# Patient Record
Sex: MALE | Race: BLACK OR AFRICAN AMERICAN | Employment: OTHER | URBAN - METROPOLITAN AREA
[De-identification: names, ages, dates, MRNs, and addresses within clinical notes are randomized per-mention and may not be internally consistent; named-entity substitution may affect disease eponyms.]

---

## 2017-03-06 ENCOUNTER — OFFICE VISIT (OUTPATIENT)
Dept: CARDIOLOGY CLINIC | Age: 67
End: 2017-03-06

## 2017-03-06 VITALS
SYSTOLIC BLOOD PRESSURE: 140 MMHG | RESPIRATION RATE: 16 BRPM | WEIGHT: 287.6 LBS | DIASTOLIC BLOOD PRESSURE: 84 MMHG | HEIGHT: 72 IN | BODY MASS INDEX: 38.95 KG/M2 | HEART RATE: 86 BPM

## 2017-03-06 DIAGNOSIS — I10 BENIGN ESSENTIAL HTN: ICD-10-CM

## 2017-03-06 DIAGNOSIS — R06.02 SHORTNESS OF BREATH: ICD-10-CM

## 2017-03-06 DIAGNOSIS — R60.0 BILATERAL EDEMA OF LOWER EXTREMITY: ICD-10-CM

## 2017-03-06 DIAGNOSIS — I42.9 CARDIOMYOPATHY (HCC): ICD-10-CM

## 2017-03-06 DIAGNOSIS — R60.9 SWELLING: ICD-10-CM

## 2017-03-06 DIAGNOSIS — I20.0 UNSTABLE ANGINA (HCC): Primary | ICD-10-CM

## 2017-03-06 RX ORDER — CARVEDILOL 25 MG/1
1 TABLET ORAL 2 TIMES DAILY
Refills: 1 | COMMUNITY
Start: 2017-02-15 | End: 2018-09-24 | Stop reason: SDUPTHER

## 2017-03-06 RX ORDER — FLUTICASONE PROPIONATE 50 MCG
2 SPRAY, SUSPENSION (ML) NASAL DAILY
Refills: 2 | COMMUNITY
Start: 2017-02-01

## 2017-03-06 RX ORDER — LORATADINE 10 MG/1
10 TABLET ORAL DAILY
Refills: 2 | COMMUNITY
Start: 2017-02-01 | End: 2017-08-11

## 2017-03-06 RX ORDER — LISINOPRIL 40 MG/1
1 TABLET ORAL DAILY
Refills: 0 | COMMUNITY
Start: 2016-12-21 | End: 2017-08-11 | Stop reason: ALTCHOICE

## 2017-03-06 RX ORDER — HYDROCHLOROTHIAZIDE 25 MG/1
12.5 TABLET ORAL DAILY
Refills: 1 | COMMUNITY
Start: 2017-02-17 | End: 2017-06-01 | Stop reason: ALTCHOICE

## 2017-03-06 RX ORDER — AMLODIPINE BESYLATE 10 MG/1
1 TABLET ORAL DAILY
Refills: 4 | COMMUNITY
Start: 2017-02-11 | End: 2017-06-01 | Stop reason: ALTCHOICE

## 2017-03-06 NOTE — LETTER
3/8/2017 10:59 PM 
 
Patient:  Nikolas Ham YOB: 1950 Date of Visit: 3/6/2017 Dear Tariq Mayfield MD 
[facility] 7 [address] VIA Facsimile: [phone] 
 : 
 
 
Thank you for referring Mr. Nikolas Ham to me for evaluation/treatment. Below are the relevant portions of my assessment and plan of care. Mr. Buffy Braswell is a 74 yo M followed in the past by Dr. Armando Obrien with a history of what sounds to be combined diastolic and systolic CHF with an EF of 45-50% IN 2013 (in 2009 of 35-40% felt to be due to hypertension), essential hypertension, lower extremity edema, referred by Dr. Clint Tesfaye for cardiac evaluation. From a cardiac standpoint, he has not seen his cardiologist within the last year or two. He has had occasional exertional shortness of breath. He denies any exertional chest pains. No significant palpitations. He has been compliant with his medications. He did have an admission in the past for CHF exacerbation. He has been on various regimens of diuretic, but is now taking Hydrochlorothiazide. He said on Furosemide, he was \"peeing too much\". He is compensated on exam with clear lungs Social History:  He denies any tobacco use, but he does probably have some second hand exposure due to his wife. 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 Family History:  History of CHF, unclear age. Assessment and Plan: 1. Unstable angina. His exertional shortness of breath is concerning for possible anginal equivalent. He does have a history of cardiomyopathy, presumed nonischemic in the past.  Will obtain an echocardiogram and stress test for further evaluation. He cannot complete a treadmill due to his lower extremity edema and will do this Lexiscan. 2. Cardiomyopathy. Presumed to be due to hypertension in the past. Echo as noted above. 3. Essential hypertension. Blood pressure is overall controlled and no changes made. 4. Second hand smoke. If you have questions, please do not hesitate to call me. Sincerely, Jeremi Elmore MD

## 2017-03-06 NOTE — MR AVS SNAPSHOT
Visit Information Date & Time Provider Department Dept. Phone Encounter #  
 3/6/2017 10:00 AM Dianne Medrano MD [facility] [place] [phone] [id number] Upcoming Health Maintenance Date Due Hepatitis C Screening 1950 DTaP/Tdap/Td series (1 - Tdap) 12/21/1972 FOBT Q 1 YEAR AGE 50-75 12/21/2001 ZOSTER VACCINE AGE 60> 12/21/2011 INFLUENZA AGE 9 TO ADULT 8/1/2016 GLAUCOMA SCREENING Q2Y 12/21/2016 Pneumococcal 65+ Low/Medium Risk (1 of 2 - PCV13) 12/21/2016 MEDICARE YEARLY EXAM 12/21/2016 Allergies as of 3/6/2017  Review Complete On: 3/6/2017 By: Dianne Medrano MD  
 Not on File Current Immunizations  Never Reviewed No immunizations on file. Not reviewed this visit You Were Diagnosed With   
  
 Codes Comments Shortness of breath    -  Primary ICD-10-CM: R06.02 
ICD-9-CM: 786.05 Swelling     ICD-10-CM: R60.9 ICD-9-CM: 388. 3 Vitals BP Pulse Resp Height(growth percentile) Weight(growth percentile) BMI  
 140/84 (BP 1 Location: Right arm, BP Patient Position: Sitting) 86 16 6' (1.829 m) 287 lb 9.6 oz (130.5 kg) 39.01 kg/m2 Smoking Status Never Smoker Vitals History BMI and BSA Data Body Mass Index Body Surface Area 39.01 kg/m 2 2.57 m 2 Your Updated Medication List  
  
   
This list is accurate as of: 3/6/17 11:11 AM.  Always use your most recent med list. amLODIPine 10 mg tablet Commonly known as:  Betty Darting Take 1 Tab by mouth daily. carvedilol 25 mg tablet Commonly known as:  Ozie Bitter Take 1 Tab by mouth two (2) times a day. fluticasone 50 mcg/actuation nasal spray Commonly known as:  Lennice Boss 2 Sprays by Both Nostrils route daily. hydroCHLOROthiazide 25 mg tablet Commonly known as:  HYDRODIURIL Take 12.5 mg by mouth daily. lisinopril 40 mg tablet Commonly known as:  Marilynne Shows Take 1 Tab by mouth daily. loratadine 10 mg tablet Commonly known as:  Dirk Giron Take 10 mg by mouth daily. We Performed the Following AMB POC EKG ROUTINE W/ 12 LEADS, INTER & REP [81257 CPT(R)] Introducing [facility] SERVICES! Elaine Vyas introduces [employer] patient portal. Now you can access parts of your medical record, email your doctor's office, and request medication refills online. 1. In your internet browser, go to [email or web address] 2. Click on the First Time User? Click Here link in the Sign In box. You will see the New Member Sign Up page. 3. Enter your [employer] Access Code exactly as it appears below. You will not need to use this code after youve completed the sign-up process. If you do not sign up before the expiration date, you must request a new code. · [employer] Access Code: [email or web address] Expires: 6/4/2017 11:11 AM 
 
4. Enter the last four digits of your Social Security Number (xxxx) and Date of Birth (mm/dd/yyyy) as indicated and click Submit. You will be taken to the next sign-up page. 5. Create a [employer] ID. This will be your [employer] login ID and cannot be changed, so think of one that is secure and easy to remember. 6. Create a [employer] password. You can change your password at any time. 7. Enter your Password Reset Question and Answer. This can be used at a later time if you forget your password. 8. Enter your e-mail address. You will receive e-mail notification when new information is available in [address] 9. Click Sign Up. You can now view and download portions of your medical record. 10. Click the Download Summary menu link to download a portable copy of your medical information. If you have questions, please visit the Frequently Asked Questions section of the [employer] website. Remember, [employer] is NOT to be used for urgent needs. For medical emergencies, dial 911. Now available from your iPhone and Android! Please provide this summary of care documentation to your next provider. Your primary care clinician is listed as JULIAN MAGUIRE. If you have any questions after today's visit, please call [phone].

## 2017-03-06 NOTE — PROGRESS NOTES
[address]: [facility]  [phone]    History of Present Illness:   Mr. Cheryle Net is a 72 yo M followed in the past by Dr. Garland Zepeda with a history of what sounds to be combined diastolic and systolic CHF with an EF of 45-50% IN 2013 (in 2009 of 35-40% felt to be due to hypertension), essential hypertension, lower extremity edema, referred by Dr. Heather Acosta for cardiac evaluation. From a cardiac standpoint, he has not seen his cardiologist within the last year or two. He has had occasional exertional shortness of breath. He denies any exertional chest pains. No significant palpitations. He has been compliant with his medications. He did have an admission in the past for CHF exacerbation. He has been on various regimens of diuretic, but is now taking Hydrochlorothiazide. He said on Furosemide, he was \"peeing too much\". He is compensated on exam with clear lungs  Social History:  He denies any tobacco use, but he does probably have some second hand exposure due to his wife. 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 Family History:  History of CHF, unclear age. Assessment and Plan:   1. Unstable angina. His exertional shortness of breath is concerning for possible anginal equivalent. He does have a history of cardiomyopathy, presumed nonischemic in the past.  Will obtain an echocardiogram and stress test for further evaluation. He cannot complete a treadmill due to his lower extremity edema and will do this Lexiscan. 2. Cardiomyopathy. Presumed to be due to hypertension in the past. Echo as noted above. 3. Essential hypertension. Blood pressure is overall controlled and no changes made. 4. Second hand smoke. He  has no past medical history on file. All other systems negative except as above.      PE  Vitals:    03/06/17 1028   BP: 140/84   Pulse: 86   Resp: 16   Weight: 287 lb 9.6 oz (130.5 kg)   Height: 6' (1.829 m)    Body mass index is 39.01 kg/(m^2). General appearance - alert, well appearing, and in no distress  Mental status - affect appropriate to mood  Eyes - sclera anicteric, moist mucous membranes  Neck - supple, no JVD  Chest - clear to auscultation, no wheezes, rales or rhonchi  Heart - normal rate, regular rhythm, normal S1, S2, no murmurs, rubs, clicks or gallops  Abdomen - soft, nontender, nondistended, no masses or organomegaly  Neurological -no focal deficit  Extremities - peripheral pulses normal, no pedal edema      Recent Labs:  Lab Results   Component Value Date/Time    Cholesterol, total 199 07/11/2009 04:45 AM    HDL Cholesterol 53 07/11/2009 04:45 AM    LDL, calculated 131.6 07/11/2009 04:45 AM    Triglyceride 72 07/11/2009 04:45 AM    CHOL/HDL Ratio 3.8 07/11/2009 04:45 AM     Lab Results   Component Value Date/Time    Creatinine (POC) 1.2 07/06/2009 10:30 PM    Creatinine 1.1 07/27/2009 04:45 AM     Lab Results   Component Value Date/Time    BUN 13 07/27/2009 04:45 AM    BUN (POC) 12 07/06/2009 10:30 PM     Lab Results   Component Value Date/Time    Potassium 4.1 07/27/2009 04:45 AM     Lab Results   Component Value Date/Time    Hemoglobin A1c 6.2 07/11/2009 04:45 AM     Lab Results   Component Value Date/Time    Hemoglobin (POC) 15.3 07/06/2009 10:30 PM    HGB 14.9 07/27/2009 04:45 AM     Lab Results   Component Value Date/Time    PLATELET 217 74/66/5258 04:45 AM       Reviewed:  No past medical history on file. History   Smoking Status    Never Smoker   Smokeless Tobacco    Not on file     History   Alcohol Use No     Comment: in the past     Not on File    Current Outpatient Prescriptions   Medication Sig    carvedilol (COREG) 25 mg tablet Take 1 Tab by mouth two (2) times a day.  amLODIPine (NORVASC) 10 mg tablet Take 1 Tab by mouth daily.  lisinopril (PRINIVIL, ZESTRIL) 40 mg tablet Take 1 Tab by mouth daily.     hydroCHLOROthiazide (HYDRODIURIL) 25 mg tablet Take 12.5 mg by mouth daily.  fluticasone (FLONASE) 50 mcg/actuation nasal spray 2 Sprays by Both Nostrils route daily.  loratadine (CLARITIN) 10 mg tablet Take 10 mg by mouth daily. No current facility-administered medications for this visit.         Cristy Porras MD  [facility] and [facility] [place]  [address] [facility] 83,[address]  [address]

## 2017-03-16 ENCOUNTER — CLINICAL SUPPORT (OUTPATIENT)
Dept: CARDIOLOGY CLINIC | Age: 67
End: 2017-03-16

## 2017-03-16 DIAGNOSIS — I10 BENIGN ESSENTIAL HTN: ICD-10-CM

## 2017-03-16 DIAGNOSIS — I42.9 CARDIOMYOPATHY (HCC): ICD-10-CM

## 2017-03-16 DIAGNOSIS — R06.02 SHORTNESS OF BREATH: ICD-10-CM

## 2017-03-16 DIAGNOSIS — R06.02 EXERTIONAL SHORTNESS OF BREATH: Primary | ICD-10-CM

## 2017-03-16 DIAGNOSIS — I42.9 CARDIOMYOPATHY (HCC): Primary | ICD-10-CM

## 2017-03-16 DIAGNOSIS — R60.0 BILATERAL EDEMA OF LOWER EXTREMITY: ICD-10-CM

## 2017-03-16 DIAGNOSIS — R60.9 SWELLING: ICD-10-CM

## 2017-03-16 DIAGNOSIS — I20.0 UNSTABLE ANGINA (HCC): ICD-10-CM

## 2017-03-17 ENCOUNTER — CLINICAL SUPPORT (OUTPATIENT)
Dept: CARDIOLOGY CLINIC | Age: 67
End: 2017-03-17

## 2017-03-17 DIAGNOSIS — I42.9 CARDIOMYOPATHY (HCC): ICD-10-CM

## 2017-03-17 DIAGNOSIS — I42.8 NON-ISCHEMIC CARDIOMYOPATHY (HCC): ICD-10-CM

## 2017-03-17 DIAGNOSIS — R60.9 SWELLING: ICD-10-CM

## 2017-03-17 DIAGNOSIS — I10 BENIGN ESSENTIAL HTN: ICD-10-CM

## 2017-03-17 DIAGNOSIS — R60.0 BILATERAL EDEMA OF LOWER EXTREMITY: ICD-10-CM

## 2017-03-17 DIAGNOSIS — R06.02 SHORTNESS OF BREATH: ICD-10-CM

## 2017-03-17 DIAGNOSIS — I10 ESSENTIAL HYPERTENSION, BENIGN: ICD-10-CM

## 2017-03-17 DIAGNOSIS — I20.0 UNSTABLE ANGINA (HCC): ICD-10-CM

## 2017-03-20 ENCOUNTER — TELEPHONE (OUTPATIENT)
Dept: CARDIOLOGY CLINIC | Age: 67
End: 2017-03-20

## 2017-03-20 NOTE — TELEPHONE ENCOUNTER
----- Message from Karl Keys MD sent at 3/20/2017  8:50 AM EDT -----  Please let pt know stress test was normal. thx    Patient identified with 2 identifiers  Called patient and gave him results of stress test as noted above and echocardiogram:  LV function improved per Dr. Marylou Mcdonald.  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 No restrictions per Dr. Mago Ochoa note, will call for any further problems.

## 2017-06-01 ENCOUNTER — OFFICE VISIT (OUTPATIENT)
Dept: CARDIOLOGY CLINIC | Age: 67
End: 2017-06-01

## 2017-06-01 VITALS
HEIGHT: 71 IN | WEIGHT: 295 LBS | BODY MASS INDEX: 41.3 KG/M2 | HEART RATE: 70 BPM | DIASTOLIC BLOOD PRESSURE: 98 MMHG | SYSTOLIC BLOOD PRESSURE: 160 MMHG

## 2017-06-01 DIAGNOSIS — R06.02 SHORTNESS OF BREATH: Primary | ICD-10-CM

## 2017-06-01 DIAGNOSIS — I42.9 CARDIOMYOPATHY, UNSPECIFIED TYPE (HCC): ICD-10-CM

## 2017-06-01 DIAGNOSIS — I10 BENIGN ESSENTIAL HTN: ICD-10-CM

## 2017-06-01 DIAGNOSIS — R60.0 EDEMA OF LEFT LOWER EXTREMITY: ICD-10-CM

## 2017-06-01 DIAGNOSIS — R60.0 BILATERAL EDEMA OF LOWER EXTREMITY: ICD-10-CM

## 2017-06-01 RX ORDER — FUROSEMIDE 40 MG/1
TABLET ORAL DAILY
COMMUNITY
End: 2017-06-01 | Stop reason: SDUPTHER

## 2017-06-01 RX ORDER — HYDRALAZINE HYDROCHLORIDE 25 MG/1
25 TABLET, FILM COATED ORAL 3 TIMES DAILY
COMMUNITY
End: 2017-08-21

## 2017-06-01 RX ORDER — CLONIDINE HYDROCHLORIDE 0.1 MG/1
0.1 TABLET ORAL 2 TIMES DAILY
Qty: 60 TAB | Refills: 11 | Status: SHIPPED | OUTPATIENT
Start: 2017-06-01 | End: 2017-08-11

## 2017-06-01 RX ORDER — FUROSEMIDE 40 MG/1
40 TABLET ORAL DAILY
Qty: 30 TAB | Refills: 11 | Status: SHIPPED | OUTPATIENT
Start: 2017-06-01 | End: 2017-08-08 | Stop reason: SDUPTHER

## 2017-06-01 RX ORDER — CLONIDINE HYDROCHLORIDE 0.1 MG/1
TABLET ORAL 2 TIMES DAILY
COMMUNITY
End: 2017-06-01 | Stop reason: SDUPTHER

## 2017-06-01 NOTE — MR AVS SNAPSHOT
Visit Information Date & Time Provider Department Dept. Phone Encounter #  
 6/1/2017  9:20 AM Samir Camara MD [facility] Ir[place] Zhu [phone] [id number] Upcoming Health Maintenance Date Due Hepatitis C Screening 1950 DTaP/Tdap/Td series (1 - Tdap) 12/21/1972 FOBT Q 1 YEAR AGE 50-75 12/21/2001 ZOSTER VACCINE AGE 60> 12/21/2011 GLAUCOMA SCREENING Q2Y 12/21/2016 Pneumococcal 65+ Low/Medium Risk (1 of 2 - PCV13) 12/21/2016 MEDICARE YEARLY EXAM 12/21/2016 INFLUENZA AGE 9 TO ADULT 8/1/2017 Allergies as of 6/1/2017  Review Complete On: 3/6/2017 By: Samir Camara MD  
 Not on File Current Immunizations  Never Reviewed No immunizations on file. Not reviewed this visit Vitals BP Pulse Height(growth percentile) Weight(growth percentile) BMI Smoking Status (!) 160/98 (BP 1 Location: Left arm, BP Patient Position: Sitting) 70 5' 11\" (1.803 m) 295 lb (133.8 kg) 41.14 kg/m2 Never Smoker Vitals History BMI and BSA Data Body Mass Index Body Surface Area  
 41.14 kg/m 2 2.59 m 2 Your Updated Medication List  
  
   
This list is accurate as of: 6/1/17 10:41 AM.  Always use your most recent med list. amLODIPine 10 mg tablet Commonly known as:  Redge Credit Take 1 Tab by mouth daily. carvedilol 25 mg tablet Commonly known as:  Watson Reef Take 1 Tab by mouth two (2) times a day. fluticasone 50 mcg/actuation nasal spray Commonly known as:  Carrie Frame 2 Sprays by Both Nostrils route daily. hydrALAZINE 25 mg tablet Commonly known as:  APRESOLINE Take 25 mg by mouth three (3) times daily. hydroCHLOROthiazide 25 mg tablet Commonly known as:  HYDRODIURIL Take 12.5 mg by mouth daily. lisinopril 40 mg tablet Commonly known as:  Zac Bold Take 1 Tab by mouth daily. loratadine 10 mg tablet Commonly known as:  Jannice Basil Take 10 mg by mouth daily. Introducing [facility]! [facility] introduces [employer] patient portal. Now you can access parts of your medical record, email your doctor's office, and request medication refills online. 1. In your internet browser, go to [email or web address] 2. Click on the First Time User? Click Here link in the Sign In box. You will see the New Member Sign Up page. 3. Enter your [employer] Access Code exactly as it appears below. You will not need to use this code after youve completed the sign-up process. If you do not sign up before the expiration date, you must request a new code. · [employer] Access Code: [email or web address] Expires: 6/4/2017 12:11 PM 
 
4. Enter the last four digits of your Social Security Number (xxxx) and Date of Birth (mm/dd/yyyy) as indicated and click Submit. You will be taken to the next sign-up page. 5. Create a [employer] ID. This will be your [employer] login ID and cannot be changed, so think of one that is secure and easy to remember. 6. Create a [employer] password. You can change your password at any time. 7. Enter your Password Reset Question and Answer. This can be used at a later time if you forget your password. 8. Enter your e-mail address. You will receive e-mail notification when new information is available in [address] 9. Click Sign Up. You can now view and download portions of your medical record. 10. Click the Download Summary menu link to download a portable copy of your medical information. If you have questions, please visit the Frequently Asked Questions section of the [employer] website. Remember, [employer] is NOT to be used for urgent needs. For medical emergencies, dial 911. Now available from your iPhone and Android! Please provide this summary of care documentation to your next provider. Your primary care clinician is listed as JULIAN MAGUIRE. If you have any questions after today's visit, please call [phone].

## 2017-06-01 NOTE — LETTER
6/1/2017 4:42 PM 
 
Patient:  Simeon Montes YOB: 1950 Date of Visit: 6/1/2017 Dear Mani Rausch MD 
[facility] 
[facility] [address] VIA Facsimile: [phone] 
 : 
 
 Mr. Evonne Goltz is a 72 yo M followed in the past by Dr. Eladio Tavarez with a history of what sounds to be combined diastolic and systolic CHF with an EF of 45-50% IN 2013 (in 2009 of 35-40% felt to be due to hypertension), essential hypertension, lower extremity edema. 3/2017 echo was normal with an EF 60% Over the last few weeks, he has had elevated blood pressures. Initially two to three weeks ago was having cough, headaches and dizziness. He took antibiotics and things 'cleared up'. He has been less short of breath. He has had persistent lower extremity edema left greater than right, but says he had an ultrasound of his leg a month or two ago at [facility] and this was okay. Because of his lower extremity edema, he saw his primary care physician and they replaced Norvasc with Hydralazine. However, it made him feel \"weird\" and he stopped this last week. He said with Amlodipine his blood pressure has been normal.  His blood pressure is elevated at 160/98 today. Due to exertional shortness of breath in 03/2017, had him do an echocardiogram and his EF had normalized and his stress test noted no ischemia and we discussed the results. He is compensated on exam with clear lungs. He does have chronic lower extremity edema 2+ on the left. Social History:  He denies any tobacco use, but he does probably have some second hand exposure due to his wife. 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 Family History:  History of CHF, unclear age. Assessment and Plan: 1. Shortness of breath. He had a recent URI and this is improving.   His echocardiogram and stress test earlier this year were normal.  He does appear to have some fluid overload likely due to elevated blood pressure. 2. Essential hypertension. His Amlodipine was stopped due to lower extremity edema and he was unable to tolerate Hydralazine. Will have him switch Hydralazine to Clonidine 0.1 mg twice daily, which can likely need to be titrated further. He will call with his blood pressures in one week and will follow up here in two weeks. For his lower extremity edema, will switch his Hydrochlorothiazide to Lasix 40 mg. He thinks he was on Lasix before and it was very effective, but he stopped it on his own in the past because he was going to the bathroom too much. He relates some of this problem to when he was having issues with his prostate. 3. Cardiomyopathy. Had resolved and was consistent with being due to hypertension. 4. Second hand smoke. If you have questions, please do not hesitate to call me. Sincerely, Monica Ricks MD

## 2017-06-01 NOTE — PROGRESS NOTES
[address]: [place]  [phone]    History of Present Illness:   Mr. Evonne Goltz is a 72 yo M followed in the past by Dr. Eladio Tavarez with a history of what sounds to be combined diastolic and systolic CHF with an EF of 45-50% IN 2013 (in 2009 of 35-40% felt to be due to hypertension), essential hypertension, lower extremity edema. 3/2017 echo was normal with an EF 60%    Over the last few weeks, he has had elevated blood pressures. Initially two to three weeks ago was having cough, headaches and dizziness. He took antibiotics and things 'cleared up'. He has been less short of breath. He has had persistent lower extremity edema left greater than right, but says he had an ultrasound of his leg a month or two ago at [facility] and this was okay. Because of his lower extremity edema, he saw his primary care physician and they replaced Norvasc with Hydralazine. However, it made him feel \"weird\" and he stopped this last week. He said with Amlodipine his blood pressure has been normal.  His blood pressure is elevated at 160/98 today. Due to exertional shortness of breath in 03/2017, had him do an echocardiogram and his EF had normalized and his stress test noted no ischemia and we discussed the results. He is compensated on exam with clear lungs. He does have chronic lower extremity edema 2+ on the left. Social History:  He denies any tobacco use, but he does probably have some second hand exposure due to his wife. 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 Family History:  History of CHF, unclear age. Assessment and Plan:   1. Shortness of breath. He had a recent URI and this is improving. His echocardiogram and stress test earlier this year were normal.  He does appear to have some fluid overload likely due to elevated blood pressure. 2. Essential hypertension.   His Amlodipine was stopped due to lower extremity edema and he was unable to tolerate Hydralazine. Will have him switch Hydralazine to Clonidine 0.1 mg twice daily, which can likely need to be titrated further. He will call with his blood pressures in one week and will follow up here in two weeks. For his lower extremity edema, will switch his Hydrochlorothiazide to Lasix 40 mg. He thinks he was on Lasix before and it was very effective, but he stopped it on his own in the past because he was going to the bathroom too much. He relates some of this problem to when he was having issues with his prostate. 3. Cardiomyopathy. Had resolved and was consistent with being due to hypertension. 4. Second hand smoke. He  has no past medical history on file. All other systems negative except as above. PE  Vitals:    06/01/17 1007   BP: (!) 160/98   Pulse: 70   Weight: 295 lb (133.8 kg)   Height: 5' 11\" (1.803 m)    Body mass index is 41.14 kg/(m^2).    General appearance - alert, well appearing, and in no distress  Mental status - affect appropriate to mood  Eyes - sclera anicteric, moist mucous membranes  Neck - supple, no JVD  Chest - clear to auscultation, no wheezes, rales or rhonchi  Heart - normal rate, regular rhythm, normal S1, S2, no murmurs, rubs, clicks or gallops  Abdomen - soft, nontender, nondistended, no masses or organomegaly  Neurological -no focal deficit  Extremities - peripheral pulses normal, no pedal edema      Recent Labs:  Lab Results   Component Value Date/Time    Cholesterol, total 199 07/11/2009 04:45 AM    HDL Cholesterol 53 07/11/2009 04:45 AM    LDL, calculated 131.6 07/11/2009 04:45 AM    Triglyceride 72 07/11/2009 04:45 AM    CHOL/HDL Ratio 3.8 07/11/2009 04:45 AM     Lab Results   Component Value Date/Time    Creatinine (POC) 1.2 07/06/2009 10:30 PM    Creatinine 1.1 07/27/2009 04:45 AM     Lab Results   Component Value Date/Time    BUN 13 07/27/2009 04:45 AM    BUN (POC) 12 07/06/2009 10:30 PM     Lab Results   Component Value Date/Time    Potassium 4.1 07/27/2009 04:45 AM     Lab Results   Component Value Date/Time    Hemoglobin A1c 6.2 07/11/2009 04:45 AM     Lab Results   Component Value Date/Time    Hemoglobin (POC) 15.3 07/06/2009 10:30 PM    HGB 14.9 07/27/2009 04:45 AM     Lab Results   Component Value Date/Time    PLATELET 512 76/26/8269 04:45 AM       Reviewed:  No past medical history on file. History   Smoking Status    Never Smoker   Smokeless Tobacco    Not on file     History   Alcohol Use No     Comment: in the past     Not on File    Current Outpatient Prescriptions   Medication Sig    hydrALAZINE (APRESOLINE) 25 mg tablet Take 25 mg by mouth three (3) times daily.  carvedilol (COREG) 25 mg tablet Take 1 Tab by mouth two (2) times a day.  lisinopril (PRINIVIL, ZESTRIL) 40 mg tablet Take 1 Tab by mouth daily.  hydroCHLOROthiazide (HYDRODIURIL) 25 mg tablet Take 12.5 mg by mouth daily.  fluticasone (FLONASE) 50 mcg/actuation nasal spray 2 Sprays by Both Nostrils route daily.  loratadine (CLARITIN) 10 mg tablet Take 10 mg by mouth daily.  amLODIPine (NORVASC) 10 mg tablet Take 1 Tab by mouth daily. No current facility-administered medications for this visit.         Bradley Mcnamara MD  [facility]ascular [place]  [address] [facility] [address]  [address]

## 2017-07-13 ENCOUNTER — OFFICE VISIT (OUTPATIENT)
Dept: CARDIOLOGY CLINIC | Age: 67
End: 2017-07-13

## 2017-07-13 ENCOUNTER — HOSPITAL ENCOUNTER (OUTPATIENT)
Dept: LAB | Age: 67
Discharge: HOME | End: 2017-07-13
Payer: MEDICARE

## 2017-07-13 VITALS
HEIGHT: 72 IN | RESPIRATION RATE: 16 BRPM | OXYGEN SATURATION: 98 % | BODY MASS INDEX: 39.42 KG/M2 | SYSTOLIC BLOOD PRESSURE: 134 MMHG | WEIGHT: 291 LBS | DIASTOLIC BLOOD PRESSURE: 64 MMHG | HEART RATE: 80 BPM

## 2017-07-13 DIAGNOSIS — I10 BENIGN ESSENTIAL HTN: ICD-10-CM

## 2017-07-13 DIAGNOSIS — I82.4Y2 DEEP VEIN THROMBOSIS (DVT) OF PROXIMAL VEIN OF LEFT LOWER EXTREMITY, UNSPECIFIED CHRONICITY (HCC): ICD-10-CM

## 2017-07-13 DIAGNOSIS — R60.0 BILATERAL EDEMA OF LOWER EXTREMITY: ICD-10-CM

## 2017-07-13 DIAGNOSIS — I42.9 CARDIOMYOPATHY, UNSPECIFIED TYPE (HCC): Primary | ICD-10-CM

## 2017-07-13 PROCEDURE — 80048 BASIC METABOLIC PNL TOTAL CA: CPT

## 2017-07-13 PROCEDURE — 85027 COMPLETE CBC AUTOMATED: CPT

## 2017-07-13 PROCEDURE — 36415 COLL VENOUS BLD VENIPUNCTURE: CPT

## 2017-07-13 RX ORDER — AMLODIPINE BESYLATE 10 MG/1
TABLET ORAL
Refills: 1 | COMMUNITY
Start: 2017-05-13 | End: 2017-08-11

## 2017-07-13 NOTE — PROGRESS NOTES
[address]: Craig  [phone]    History of Present Illness:   Mr. Padmini Juarez is a 72 yo M followed in the past by Dr. Hamilton Koenig with a history of what sounds to be combined diastolic and systolic CHF with an EF of 45-50% in 2013 (in 2009 of 35-40% felt to be due to hypertension), essential hypertension, chronic lower extremity edema. 3/2017 echo was normal with an EF 60%, stress test no ischemia. Apparently, [facility] in his left leg. He said he had a procedure where they \"broke down the clot\" and he was prescribed and has been taking Coumadin. He was told to stop Lisinopril, but it is unclear on his medication list what exactly he is taking for blood pressure. His blood pressure here was okay at 134/64 mmHg. From a symptom standpoint, his breathing has been okay. He does have daytime somnolence and has a sleep study scheduled. He says his legs still feel \"sluggish\". No exertional chest pains. He is compensated on exam with clear lungs. He has chronic 2-3+ bilateral lower extremity edema. Social History:  He denies any tobacco use, but he does probably have some second hand exposure due to his wife. 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 Family History:  History of CHF, unclear age. Assessment and Plan:   1. DVT. Will request and review his records. He is on Coumadin. 2. Essential hypertension. Blood pressure is okay here, but need for him to clarify his blood pressure medications and get daily blood pressure checks for the next week and call us with his numbers and will make adjustments as needed. Will obtain blood work and check his kidney function. If his kidney function has normalized, can likely restart his Lisinopril and possibly stop Hydralazine. He did not think he was taking Hydralazine, though it was listed on his medications.   Again, just need to clarify his medications. He will follow back in one month and reassess. 3. Cardiomyopathy. Had resolved and his echocardiogram and stress test earlier this year were normal.  This was consistent with his cardiomyopathy being due to hypertension. 4. Second hand smoke. He  has no past medical history on file. All other systems negative except as above. PE  Vitals:    07/13/17 1122   BP: 134/64   Pulse: 80   Resp: 16   SpO2: 98%   Weight: 291 lb (132 kg)   Height: 6' (1.829 m)    Body mass index is 39.47 kg/(m^2). General appearance - alert, well appearing, and in no distress  Mental status - affect appropriate to mood  Eyes - sclera anicteric, moist mucous membranes  Neck - supple, no JVD  Chest - clear to auscultation, no wheezes, rales or rhonchi  Heart - normal rate, regular rhythm, normal S1, S2, no murmurs, rubs, clicks or gallops  Abdomen - soft, nontender, nondistended, no masses or organomegaly  Neurological -no focal deficit  Extremities - peripheral pulses normal, no pedal edema      Recent Labs:  Lab Results   Component Value Date/Time    Cholesterol, total 199 07/11/2009 04:45 AM    HDL Cholesterol 53 07/11/2009 04:45 AM    LDL, calculated 131.6 07/11/2009 04:45 AM    Triglyceride 72 07/11/2009 04:45 AM    CHOL/HDL Ratio 3.8 07/11/2009 04:45 AM     Lab Results   Component Value Date/Time    Creatinine (POC) 1.2 07/06/2009 10:30 PM    Creatinine 1.1 07/27/2009 04:45 AM     Lab Results   Component Value Date/Time    BUN 13 07/27/2009 04:45 AM    BUN (POC) 12 07/06/2009 10:30 PM     Lab Results   Component Value Date/Time    Potassium 4.1 07/27/2009 04:45 AM     Lab Results   Component Value Date/Time    Hemoglobin A1c 6.2 07/11/2009 04:45 AM     Lab Results   Component Value Date/Time    Hemoglobin (POC) 15.3 07/06/2009 10:30 PM    HGB 14.9 07/27/2009 04:45 AM     Lab Results   Component Value Date/Time    PLATELET 988 03/18/7027 04:45 AM       Reviewed:  History reviewed.  No pertinent past medical history. History   Smoking Status    Never Smoker   Smokeless Tobacco    Never Used     History   Alcohol Use No     Comment: in the past     Allergies   Allergen Reactions    Pcn [Penicillins] Rash       Current Outpatient Prescriptions   Medication Sig    cloNIDine HCl (CATAPRES) 0.1 mg tablet Take 1 Tab by mouth two (2) times a day.  amLODIPine (NORVASC) 10 mg tablet TAKE 1 TABLET BY MOUTH EVERY DAY FOR 90 DAYS    hydrALAZINE (APRESOLINE) 25 mg tablet Take 25 mg by mouth three (3) times daily.  furosemide (LASIX) 40 mg tablet Take 1 Tab by mouth daily.  carvedilol (COREG) 25 mg tablet Take 1 Tab by mouth two (2) times a day.  lisinopril (PRINIVIL, ZESTRIL) 40 mg tablet Take 1 Tab by mouth daily.  fluticasone (FLONASE) 50 mcg/actuation nasal spray 2 Sprays by Both Nostrils route daily.  loratadine (CLARITIN) 10 mg tablet Take 10 mg by mouth daily. No current facility-administered medications for this visit.         MD [facility] [place]  [address] [facility] [address]  [address]

## 2017-07-13 NOTE — MR AVS SNAPSHOT
Visit Information Date & Time Provider Department Dept. Phone Encounter #  
 7/13/2017  9:00 AM Jeremy Anderws MD [facility] Malu Leal [phone] [id number] Your Appointments 7/27/2017  2:50 PM  
New Patient with Tito Garcia MD  
[facility] [address]) Appt Note: New Patient Thyroid Referred by Dr. El Yarbrough [phone]; NP  
 [address] [facility] [address] [phone] [address] [facility] Upcoming Health Maintenance Date Due Hepatitis C Screening 1950 DTaP/Tdap/Td series (1 - Tdap) 12/21/1972 FOBT Q 1 YEAR AGE 50-75 12/21/2001 ZOSTER VACCINE AGE 60> 12/21/2011 GLAUCOMA SCREENING Q2Y 12/21/2016 Pneumococcal 65+ Low/Medium Risk (1 of 2 - PCV13) 12/21/2016 MEDICARE YEARLY EXAM 12/21/2016 INFLUENZA AGE 9 TO ADULT 8/1/2017 Allergies as of 7/13/2017  Review Complete On: 3/6/2017 By: Jeremy Andrews MD  
  
 Severity Noted Reaction Type Reactions Pcn [Penicillins]  07/13/2017    Rash Current Immunizations  Never Reviewed No immunizations on file. Not reviewed this visit You Were Diagnosed With   
  
 Codes Comments Cardiomyopathy, unspecified type ([facility]    -  Primary ICD-10-CM: I42.9 ICD-9-CM: 425.4 Bilateral edema of lower extremity     ICD-10-CM: R60.0 ICD-9-CM: 998. 3 Vitals BP Pulse Resp Height(growth percentile) Weight(growth percentile) SpO2  
 134/64 (BP 1 Location: Left arm, BP Patient Position: Sitting) 80 16 6' (1.829 m) 291 lb (132 kg) 98% BMI Smoking Status 39.47 kg/m2 Never Smoker BMI and BSA Data Body Mass Index Body Surface Area  
 39.47 kg/m 2 2.59 m 2 Preferred Pharmacy Pharmacy Name Phone [id number] [phone] Your Updated Medication List  
  
   
 This list is accurate as of: 7/13/17 11:48 AM.  Always use your most recent med list. amLODIPine 10 mg tablet Commonly known as:  Warren Ape TAKE 1 TABLET BY MOUTH EVERY DAY FOR 90 DAYS  
  
 carvedilol 25 mg tablet Commonly known as:  Micheline Salts Take 1 Tab by mouth two (2) times a day. cloNIDine HCl 0.1 mg tablet Commonly known as:  CATAPRES Take 1 Tab by mouth two (2) times a day. fluticasone 50 mcg/actuation nasal spray Commonly known as:  Tina Lavina 2 Sprays by Both Nostrils route daily. furosemide 40 mg tablet Commonly known as:  LASIX Take 1 Tab by mouth daily. hydrALAZINE 25 mg tablet Commonly known as:  APRESOLINE Take 25 mg by mouth three (3) times daily. lisinopril 40 mg tablet Commonly known as:  Brunilda Desanctis Take 1 Tab by mouth daily. loratadine 10 mg tablet Commonly known as:  Ishmael Arroyo Take 10 mg by mouth daily. Introducing [facility]! Tiffanie Griffin introduces [employer] patient portal. Now you can access parts of your medical record, email your doctor's office, and request medication refills online. 1. In your internet browser, go to [email or web address] 2. Click on the First Time User? Click Here link in the Sign In box. You will see the New Member Sign Up page. 3. Enter your [employer] Access Code exactly as it appears below. You will not need to use this code after youve completed the sign-up process. If you do not sign up before the expiration date, you must request a new code. · [employer] Access Code: [email or web address] Expires: 10/11/2017 11:48 AM 
 
4. Enter the last four digits of your Social Security Number (xxxx) and Date of Birth (mm/dd/yyyy) as indicated and click Submit. You will be taken to the next sign-up page. 5. Create a [employer] ID. This will be your [employer] login ID and cannot be changed, so think of one that is secure and easy to remember. 6. Create a [employer] password. You can change your password at any time. 7. Enter your Password Reset Question and Answer. This can be used at a later time if you forget your password. 8. Enter your e-mail address. You will receive e-mail notification when new information is available in [address] 9. Click Sign Up. You can now view and download portions of your medical record. 10. Click the Download Summary menu link to download a portable copy of your medical information. If you have questions, please visit the Frequently Asked Questions section of the [employer] website. Remember, [employer] is NOT to be used for urgent needs. For medical emergencies, dial 911. Now available from your iPhone and Android! Please provide this summary of care documentation to your next provider. Your primary care clinician is listed as JULIAN MAGUIRE. If you have any questions after today's visit, please call [phone].

## 2017-07-13 NOTE — LETTER
7/15/2017 12:14 AM 
 
Patient:  Edelmira Kennedy YOB: 1950 Date of Visit: 7/13/2017 Dear Mala Liriano MD 
[facility] 7 [address] VIA Facsimile: [phone] 
 : 
Mr. Padmini Juarez is a 74 yo M followed in the past by Dr. Hamilton Koenig with a history of what sounds to be combined diastolic and systolic CHF with an EF of 45-50% in 2013 (in 2009 of 35-40% felt to be due to hypertension), essential hypertension, chronic lower extremity edema. 3/2017 echo was normal with an EF 60%, stress test no ischemia. Apparently, [facility] his left [facility]. He said he had a procedure where they \"broke down the clot\" and he was prescribed and has been taking Coumadin. He was told to stop Lisinopril, but it is unclear on his medication list what exactly he is taking for blood pressure. His blood pressure here was okay at 134/64 mmHg. From a symptom standpoint, his breathing has been okay. He does have daytime somnolence and has a sleep study scheduled. He says his legs still feel \"sluggish\". No exertional chest pains. He is compensated on exam with clear lungs. He has chronic 2-3+ bilateral lower extremity edema. Social History:  He denies any tobacco use, but he does probably have some second hand exposure due to his wife. 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 Family History:  History of CHF, unclear age. Assessment and Plan: 1. DVT. Will request and review his records. He is on Coumadin. 2. Essential hypertension. Blood pressure is okay here, but need for him to clarify his blood pressure medications and get daily blood pressure checks for the next week and call us with his numbers and will make adjustments as needed. Will obtain blood work and check his kidney function.   If his kidney function has normalized, can likely restart his Lisinopril and possibly stop Hydralazine. He did not think he was taking Hydralazine, though it was listed on his medications. Again, just need to clarify his medications. He will follow back in one month and reassess. 3. Cardiomyopathy. Had resolved and his echocardiogram and stress test earlier this year were normal.  This was consistent with his cardiomyopathy being due to hypertension. 4. Second hand smoke. If you have questions, please do not hesitate to call me. Sincerely, Dhaval Pike MD

## 2017-07-14 LAB
BUN SERPL-MCNC: 13 MG/DL (ref 8–27)
BUN/CREAT SERPL: 13 (ref 10–24)
CALCIUM SERPL-MCNC: 9.2 MG/DL (ref 8.6–10.2)
CHLORIDE SERPL-SCNC: 100 MMOL/L (ref 96–106)
CO2 SERPL-SCNC: 26 MMOL/L (ref 18–29)
CREAT SERPL-MCNC: 1.02 MG/DL (ref 0.76–1.27)
ERYTHROCYTE [DISTWIDTH] IN BLOOD BY AUTOMATED COUNT: 15 % (ref 12.3–15.4)
GLUCOSE SERPL-MCNC: 106 MG/DL (ref 65–99)
HCT VFR BLD AUTO: 35.5 % (ref 37.5–51)
HGB BLD-MCNC: 11 G/DL (ref 12.6–17.7)
MCH RBC QN AUTO: 26.6 PG (ref 26.6–33)
MCHC RBC AUTO-ENTMCNC: 31 G/DL (ref 31.5–35.7)
MCV RBC AUTO: 86 FL (ref 79–97)
PLATELET # BLD AUTO: 287 X10E3/UL (ref 150–379)
POTASSIUM SERPL-SCNC: 4.1 MMOL/L (ref 3.5–5.2)
RBC # BLD AUTO: 4.13 X10E6/UL (ref 4.14–5.8)
SODIUM SERPL-SCNC: 142 MMOL/L (ref 134–144)
WBC # BLD AUTO: 6.4 X10E3/UL (ref 3.4–10.8)

## 2017-08-08 RX ORDER — FUROSEMIDE 40 MG/1
40 TABLET ORAL DAILY
Qty: 90 TAB | Refills: 3 | Status: SHIPPED | OUTPATIENT
Start: 2017-08-08 | End: 2019-04-29 | Stop reason: SDUPTHER

## 2017-08-08 NOTE — TELEPHONE ENCOUNTER
Requested Prescriptions     Signed Prescriptions Disp Refills    furosemide (LASIX) 40 mg tablet 90 Tab 3     Sig: Take 1 Tab by mouth daily.      Authorizing Provider: Ariel Kirkpatrick     Ordering User: Makenzie Guillen orders

## 2017-08-11 ENCOUNTER — OFFICE VISIT (OUTPATIENT)
Dept: CARDIOLOGY CLINIC | Age: 67
End: 2017-08-11

## 2017-08-11 VITALS
HEIGHT: 72 IN | WEIGHT: 293.6 LBS | SYSTOLIC BLOOD PRESSURE: 124 MMHG | BODY MASS INDEX: 39.77 KG/M2 | DIASTOLIC BLOOD PRESSURE: 82 MMHG | RESPIRATION RATE: 16 BRPM | HEART RATE: 64 BPM

## 2017-08-11 DIAGNOSIS — I42.9 CARDIOMYOPATHY, UNSPECIFIED TYPE (HCC): Primary | ICD-10-CM

## 2017-08-11 DIAGNOSIS — I10 BENIGN ESSENTIAL HTN: ICD-10-CM

## 2017-08-11 DIAGNOSIS — I82.492 DEEP VEIN THROMBOSIS (DVT) OF OTHER VEIN OF LEFT LOWER EXTREMITY: ICD-10-CM

## 2017-08-11 RX ORDER — LOSARTAN POTASSIUM 100 MG/1
100 TABLET ORAL DAILY
Qty: 30 TAB | Refills: 11 | Status: SHIPPED | OUTPATIENT
Start: 2017-08-11 | End: 2018-08-12 | Stop reason: SDUPTHER

## 2017-08-11 RX ORDER — LOSARTAN POTASSIUM 100 MG/1
100 TABLET ORAL DAILY
COMMUNITY
End: 2017-08-11 | Stop reason: SDUPTHER

## 2017-08-11 RX ORDER — WARFARIN SODIUM 5 MG/1
1 TABLET ORAL AS DIRECTED
Refills: 3 | COMMUNITY
Start: 2017-07-05

## 2017-08-11 NOTE — PROGRESS NOTES
[address]: [place]  [phone]    History of Present Illness:   Mr. Elise Anderson is a 72 yo M followed in the past by Dr. Fermin Lomeli with a history of what sounds to be combined diastolic and systolic CHF with an EF of 45-50% in 2013 (in 2009 of 35-40% felt to be due to hypertension), essential hypertension, chronic lower extremity edema. 3/2017 echo was normal with an EF 60%, stress test no ischemia. I saw him on his last visit for recently diagnosed DVT. He had a sleep study in the meantime also that noted he does have sleep apnea. With regard to his blood pressure, it was not exactly clear what he was taking and requested and reviewed his chart. Because he had contrast induced renal insufficiency, his Lisinopril and Hydrochlorothiazide were replaced with Clonidine. His blood pressures have been normal.  From a symptom standpoint, his breathing has been better. He denies any exertional chest pains. His leg is improving. He did ask questions about sleep apnea and whether he should start the CPAP machine and I do think this would be a good idea. He is compensated on exam with clear lungs. He has chronic 2-3+ bilateral lower extremity edema. Social History:  He denies any tobacco use, but he does probably have some second hand exposure due to his wife. 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 Family History:  History of CHF, unclear age. Assessment and Plan:   1. Cardiomyopathy. Had resolved and stress test earlier this year was normal.  Most consistent with his cardiomyopathy being due to hypertension. 2. Essential hypertension. Blood pressure is much improved and will have him continue Coreg. Will try to get him back on an ARB. He thinks he had a cough with Lisinopril. For now,will try holding the Clonidine and the Norvasc. He will call with his blood pressures in one week.   If his blood pressure is elevated, would add back Clonidine 0.1 mg bid. He will follow back here in two months, but will make adjustments over the phone with his blood pressure as needed. 3. Second hand smoke. 4. Left lower extremity DVT, status post thrombolysis. He is on Coumadin. He  has no past medical history on file. All other systems negative except as above. PE  Vitals:    08/11/17 1409   BP: 124/82   Pulse: 64   Resp: 16   Weight: 293 lb 9.6 oz (133.2 kg)   Height: 6' (1.829 m)    Body mass index is 39.82 kg/(m^2). General appearance - alert, well appearing, and in no distress  Mental status - affect appropriate to mood  Eyes - sclera anicteric, moist mucous membranes  Neck - supple, no JVD  Chest - clear to auscultation, no wheezes, rales or rhonchi  Heart - normal rate, regular rhythm, normal S1, S2, no murmurs, rubs, clicks or gallops  Abdomen - soft, nontender, nondistended, no masses or organomegaly  Neurological -no focal deficit  Extremities - peripheral pulses normal, no pedal edema      Recent Labs:  Lab Results   Component Value Date/Time    Cholesterol, total 199 07/11/2009 04:45 AM    HDL Cholesterol 53 07/11/2009 04:45 AM    LDL, calculated 131.6 07/11/2009 04:45 AM    Triglyceride 72 07/11/2009 04:45 AM    CHOL/HDL Ratio 3.8 07/11/2009 04:45 AM     Lab Results   Component Value Date/Time    Creatinine (POC) 1.2 07/06/2009 10:30 PM    Creatinine 1.02 07/13/2017 11:57 AM     Lab Results   Component Value Date/Time    BUN 13 07/13/2017 11:57 AM    BUN (POC) 12 07/06/2009 10:30 PM     Lab Results   Component Value Date/Time    Potassium 4.1 07/13/2017 11:57 AM     Lab Results   Component Value Date/Time    Hemoglobin A1c 6.2 07/11/2009 04:45 AM     Lab Results   Component Value Date/Time    Hemoglobin (POC) 15.3 07/06/2009 10:30 PM    HGB 11.0 07/13/2017 11:57 AM     Lab Results   Component Value Date/Time    PLATELET 760 54/97/0106 11:57 AM       Reviewed:  No past medical history on file.   History   Smoking Status    Never Smoker   Smokeless Tobacco    Never Used     History   Alcohol Use No     Comment: in the past     Allergies   Allergen Reactions    Pcn [Penicillins] Rash       Current Outpatient Prescriptions   Medication Sig    warfarin (COUMADIN) 5 mg tablet Take 1 Tab by mouth as directed.  furosemide (LASIX) 40 mg tablet Take 1 Tab by mouth daily.  amLODIPine (NORVASC) 10 mg tablet TAKE 1 TABLET BY MOUTH EVERY DAY FOR 90 DAYS    cloNIDine HCl (CATAPRES) 0.1 mg tablet Take 1 Tab by mouth two (2) times a day.  carvedilol (COREG) 25 mg tablet Take 1 Tab by mouth two (2) times a day.  fluticasone (FLONASE) 50 mcg/actuation nasal spray 2 Sprays by Both Nostrils route daily.  hydrALAZINE (APRESOLINE) 25 mg tablet Take 25 mg by mouth three (3) times daily.  lisinopril (PRINIVIL, ZESTRIL) 40 mg tablet Take 1 Tab by mouth daily.  loratadine (CLARITIN) 10 mg tablet Take 10 mg by mouth daily. No current facility-administered medications for this visit.         Sterling Jovel MD  [facility] [place]  [address] [facility] [address]  [address]

## 2017-08-11 NOTE — MR AVS SNAPSHOT
Visit Information Date & Time Provider Department Dept. Phone Encounter #  
 8/11/2017  2:20 PM Aidan De Los Santos MD [facility] Kisha Cramer [phone] [id number] Your Appointments 8/21/2017  9:50 AM  
New Patient with [place] [facility]) Appt Note: New Patient Thyroid Referred by Dr. Jerris Homans [address] [facility] [address] [phone] [address] [place] [address] Road  
  
    
 10/17/2017  1:00 PM  
ESTABLISHED PATIENT with Aidan De Los Santos MD  
[facility] ([facility]) Appt Note: 2 month follow up  
 [address]  [facility] [address] [facility] 7 [address] Upcoming Health Maintenance Date Due Hepatitis C Screening 1950 DTaP/Tdap/Td series (1 - Tdap) 12/21/1972 FOBT Q 1 YEAR AGE 50-75 12/21/2001 ZOSTER VACCINE AGE 60> 10/21/2011 GLAUCOMA SCREENING Q2Y 12/21/2016 Pneumococcal 65+ Low/Medium Risk (1 of 2 - PCV13) 12/21/2016 MEDICARE YEARLY EXAM 12/21/2016 INFLUENZA AGE 9 TO ADULT 8/1/2017 Allergies as of 8/11/2017  Review Complete On: 8/11/2017 By: Rowan Bedoya Severity Noted Reaction Type Reactions Pcn [Penicillins]  07/13/2017    Rash Current Immunizations  Never Reviewed No immunizations on file. Not reviewed this visit You Were Diagnosed With   
  
 Codes Comments Benign essential HTN    -  Primary ICD-10-CM: I10 
ICD-9-CM: 401.1 Vitals BP Pulse Resp Height(growth percentile) Weight(growth percentile) BMI  
 124/82 (BP 1 Location: Right arm, BP Patient Position: Sitting) 64 16 6' (1.829 m) 293 lb 9.6 oz (133.2 kg) 39.82 kg/m2 Smoking Status Never Smoker Vitals History BMI and BSA Data Body Mass Index Body Surface Area  
 39.82 kg/m 2 2.6 m 2 Preferred Pharmacy [facility] Name Phone [id number] [phone] Your Updated Medication List  
  
   
This list is accurate as of: 8/11/17  3:06 PM.  Always use your most recent med list.  
  
  
  
  
 carvedilol 25 mg tablet Commonly known as:  Peggi Medicine Take 1 Tab by mouth two (2) times a day. fluticasone 50 mcg/actuation nasal spray Commonly known as:  Hosea Mooreland 2 Sprays by Both Nostrils route daily. furosemide 40 mg tablet Commonly known as:  LASIX Take 1 Tab by mouth daily. hydrALAZINE 25 mg tablet Commonly known as:  APRESOLINE Take 25 mg by mouth three (3) times daily. losartan 100 mg tablet Commonly known as:  COZAAR Take 1 Tab by mouth daily. warfarin 5 mg tablet Commonly known as:  COUMADIN Take 1 Tab by mouth as directed. Prescriptions Sent to Pharmacy Refills  
 losartan (COZAAR) 100 mg tablet 11 Sig: Take 1 Tab by mouth daily. Class: Normal  
 Pharmacy: [address] Ph #: [phone] Route: Oral  
  
Introducing [facility]! [employer] introduces [employer] patient portal. Now you can access parts of your medical record, email your doctor's office, and request medication refills online. 1. In your internet browser, go to [email or web address] 2. Click on the First Time User? Click Here link in the Sign In box. You will see the New Member Sign Up page. 3. Enter your [employer] Access Code exactly as it appears below. You will not need to use this code after youve completed the sign-up process. If you do not sign up before the expiration date, you must request a new code. · [employer] Access Code: [email or web address] Expires: 10/11/2017 11:48 AM 
 
4.  Enter the last four digits of your Social Security Number (xxxx) and Date of Birth (mm/dd/yyyy) as indicated and click Submit. You will be taken to the next sign-up page. 5. Create a [employer] ID. This will be your [employer] login ID and cannot be changed, so think of one that is secure and easy to remember. 6. Create a [employer] password. You can change your password at any time. 7. Enter your Password Reset Question and Answer. This can be used at a later time if you forget your password. 8. Enter your e-mail address. You will receive e-mail notification when new information is available in [address] 9. Click Sign Up. You can now view and download portions of your medical record. 10. Click the Download Summary menu link to download a portable copy of your medical information. If you have questions, please visit the Frequently Asked Questions section of the [employer] website. Remember, [employer] is NOT to be used for urgent needs. For medical emergencies, dial 911. Now available from your iPhone and Android! Please provide this summary of care documentation to your next provider. Your primary care clinician is listed as Catia Vegas. If you have any questions after today's visit, please call [phone].

## 2017-08-11 NOTE — LETTER
8/17/2017 3:32 PM 
 
Patient:  Fayetta Epley YOB: 1950 Date of Visit: 8/11/2017 Dear Shelly Charles MD 
[facility] 7 [address] VIA Facsimile: [phone] 
 : 
Mr. Geena Ricardo is a 72 yo M followed in the past by Dr. Luma Sidhu with a history of what sounds to be combined diastolic and systolic CHF with an EF of 45-50% in 2013 (in 2009 of 35-40% felt to be due to hypertension), essential hypertension, chronic lower extremity edema. 3/2017 echo was normal with an EF 60%, stress test no ischemia. I saw him on his last visit for recently diagnosed DVT. He had a sleep study in the meantime also that noted he does have sleep apnea. With regard to his blood pressure, it was not exactly clear what he was taking and requested and reviewed his chart. Because he had contrast induced renal insufficiency, his Lisinopril and Hydrochlorothiazide were replaced with Clonidine. His blood pressures have been normal.  From a symptom standpoint, his breathing has been better. He denies any exertional chest pains. His leg is improving. He did ask questions about sleep apnea and whether he should start the CPAP machine and I do think this would be a good idea. He is compensated on exam with clear lungs. He has chronic 2-3+ bilateral lower extremity edema. Social History:  He denies any tobacco use, but he does probably have some second hand exposure due to his wife. 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 Family History:  History of CHF, unclear age. Assessment and Plan: 1. Cardiomyopathy. Had resolved and stress test earlier this year was normal.  Most consistent with his cardiomyopathy being due to hypertension. 2. Essential hypertension. Blood pressure is much improved and will have him continue Coreg. Will try to get him back on an ARB.   He thinks he had a cough with Lisinopril. For now,will try holding the Clonidine and the Norvasc. He will call with his blood pressures in one week. If his blood pressure is elevated, would add back Clonidine 0.1 mg bid. He will follow back here in two months, but will make adjustments over the phone with his blood pressure as needed. 3. Second hand smoke. 4. Left lower extremity DVT, status post thrombolysis. He is on Coumadin. If you have questions, please do not hesitate to call me. Sincerely, Gallito Carballo MD

## 2017-08-17 PROBLEM — I82.402 DEEP VEIN THROMBOSIS (DVT) OF LEFT LOWER EXTREMITY (HCC): Status: ACTIVE | Noted: 2017-08-17

## 2017-08-21 ENCOUNTER — OFFICE VISIT (OUTPATIENT)
Dept: ENDOCRINOLOGY | Age: 67
End: 2017-08-21

## 2017-08-21 ENCOUNTER — TELEPHONE (OUTPATIENT)
Dept: CARDIOLOGY CLINIC | Age: 67
End: 2017-08-21

## 2017-08-21 VITALS
HEART RATE: 75 BPM | DIASTOLIC BLOOD PRESSURE: 86 MMHG | WEIGHT: 293.2 LBS | BODY MASS INDEX: 39.77 KG/M2 | SYSTOLIC BLOOD PRESSURE: 149 MMHG

## 2017-08-21 DIAGNOSIS — E04.1 THYROID NODULE: Primary | ICD-10-CM

## 2017-08-21 RX ORDER — CLONIDINE HYDROCHLORIDE 0.1 MG/1
0.1 TABLET ORAL 2 TIMES DAILY
Qty: 180 TAB | Refills: 2 | Status: SHIPPED | OUTPATIENT
Start: 2017-08-21 | End: 2018-03-19 | Stop reason: ALTCHOICE

## 2017-08-21 NOTE — TELEPHONE ENCOUNTER
Would add clonidine 0.1 mg bid.  Call with BP in 1 week.  Goal systolic 184-408H.  thx (Routing comment)            Called patient left detailed message adding Clonidine 0.1mg BID per Dr. Nish Stover orders. Patient will call back in 1 week with BP reading.           Requested Prescriptions     Signed Prescriptions Disp Refills    cloNIDine HCl (CATAPRES) 0.1 mg tablet 180 Tab 2     Sig: Take 1 Tab by mouth two (2) times a day. Authorizing Provider: Tima Gonzalez     Ordering User: Kirsten Oconnor     Patient called back to verify my message. Instructions given.

## 2017-08-21 NOTE — PATIENT INSTRUCTIONS
Thyroid Nodules: Care Instructions  Your Care Instructions  Thyroid nodules are growths or lumps in the thyroid gland. Your thyroid is in the front of your neck. It controls how your body uses energy. You may have tests to see if the nodule is caused by cancer. Most nodules aren't cancer and don't cause problems. Many don't even need treatment. If you do have cancer, it can usually be cured. Treatment will probably include surgery. You may also get radioactive iodine treatment. If your thyroid can't make thyroid hormone after treatment, you can take a pill every day to replace the hormone. Follow-up care is a key part of your treatment and safety. Be sure to make and go to all appointments, and call your doctor if you are having problems. It's also a good idea to know your test results and keep a list of the medicines you take. How can you care for yourself at home? · Be safe with medicines. If you take thyroid hormone medicine:  ¨ Take it exactly as prescribed. Call your doctor if you think you are having a problem with your medicine. If you take the right amount and don't skip doses, you probably won't have side effects. ¨ Do not take it with calcium, vitamins, or iron. ¨ Try not to miss a dose. ¨ Do not take extra doses. This will not help you get better any faster. It may also cause side effects. ¨ Tell your doctor about any medicines you take. This includes over-the-counter medicines. ¨ Wear a medical alert bracelet or necklace that says you take thyroid hormones. You can buy these at most drugstores. When should you call for help? Call 911 anytime you think you may need emergency care. For example, call if:  · You lose consciousness. Call your doctor now or seek immediate medical care if:  · You have shortness of breath. Watch closely for changes in your health, and be sure to contact your doctor if:  · You have pain in your neck, jaw, or ear. · You have problems swallowing.   · You feel weak and tired. · You have nervousness, a fast heartbeat, hand tremors, problems sleeping, increased sweating, and weight loss. · You do not feel better even though you are taking your medicine. Where can you learn more? Go to http://eric-denise.info/. Enter B955 in the search box to learn more about \"Thyroid Nodules: Care Instructions. \"  Current as of: January 31, 2017  Content Version: 11.3  © 3132-0597 [employer]. Care instructions adapted under license by [employer] (which disclaims liability or warranty for this information). If you have questions about a medical condition or this instruction, always ask your healthcare professional. [address] any warranty or liability for your use of this information.

## 2017-08-21 NOTE — PROGRESS NOTES
Chief Complaint   Patient presents with    Thyroid Problem     pcp and pharmacy verified   Records reviewed. History of Present Illness: Sebastien Pizarro is a 73 y.o. male, who I was asked to see in consult by Dr. Garo Villa for goiter. Will request labs and records from PCP. Pt notes that \"I had it looked at about 7 years ago and he told me it was ok and to not worry about it. \" He notes the size has gotten larger over the past few years and he wants to get it looked at. He first noted the goiter around 2007. He notes his brother had a thyroid problem and \"he had to have radiation\" (It sounds like he had MNG or Graves' treated with SCHMIDT). Pt has hx of prostate cancer, diagnosed 3 years ago. He was followed by Dr. Elliot Dodson of Urology. He was treated with surgery only, no chemo or radiation. No known family hx of cancers. Pt was [place] in [place], he has never lived outside the [address] [place] [address]. No known exposures to ionizing radiation. He notes recent issues of occasional dysphagia, dysphonia, chocking or hoarseness of voice. Pt notes he had an US does at [facility] about a month ago. Will request the US report. He is followed by Dr. Refugio Bain of cardiology for issues of HTN and CHF. Past Medical History:   Diagnosis Date    DVT (deep venous thrombosis) ([facility] [address] 06/2017    left lower leg    Goiter     Hypertension     Prostate cancer ([facility] [address]      Past Surgical History:   Procedure Laterality Date    HX KNEE ARTHROSCOPY      Rt knee    HX PROSTATECTOMY       Current Outpatient Prescriptions   Medication Sig    warfarin (COUMADIN) 5 mg tablet Take 1 Tab by mouth as directed.  losartan (COZAAR) 100 mg tablet Take 1 Tab by mouth daily.  furosemide (LASIX) 40 mg tablet Take 1 Tab by mouth daily.  carvedilol (COREG) 25 mg tablet Take 1 Tab by mouth two (2) times a day.  fluticasone (FLONASE) 50 mcg/actuation nasal spray 2 Sprays by Both Nostrils route daily.      No current facility-administered medications for this visit. Allergies   Allergen Reactions    Lisinopril Other (comments)     Altered kidney function    Pcn [Penicillins] Rash     Family History   Problem Relation Age of Onset    Arthritis-osteo Mother     Heart Failure Father     Diabetes Brother     Thyroid Disease Brother     Other Maternal Grandmother      MS    No Known Problems Maternal Grandfather     No Known Problems Paternal Grandmother     No Known Problems Paternal Grandfather     Cancer Neg Hx      Social History     Social History    Marital status:      Spouse name: N/A    Number of children: N/A    Years of education: N/A     Occupational History    Not on file. Social History Main Topics    Smoking status: Never Smoker    Smokeless tobacco: Never Used    Alcohol use No      Comment: in the past    Drug use: No      Comment: in the past    Sexual activity: Not on file     Other Topics Concern    Not on file     Social History Narrative     Review of Systems:  - Constitutional Symptoms: no fevers, chills, weight loss  - Eyes: no blurry vision or double vision  - Cardiovascular: + CHF  - Respiratory: no cough or shortness of breath  - Gastrointestinal: occasional dysphagia  - Musculoskeletal: no joint pains or weakness  - Integumentary: no rashes  - Neurological: no numbness, tingling, or headaches  - Psychiatric: no depression or anxiety  - Endocrine: no heat or cold intolerance, no polyuria or polydipsia    Physical Examination:  Blood pressure 149/86, pulse 75, weight 293 lb 3.2 oz (133 kg).   - General: pleasant, no distress, good eye contact  - HEENT: no exopthalmos, no periorbital edema, no scleral/conjunctival injection, EOMI, no lid lag or stare  - Neck: supple, large left sided nodule, no lymph nodes, or carotid bruits, no supraclavicular or dorsocervical fat pads  - Cardiovascular: regular, normal rate, normal S1 and S2, no murmurs/rubs/gallops   - Respiratory: clear to auscultation bilaterally  - Gastrointestinal: soft, nontender, nondistended, no masses, no hepatosplenomegaly  - Musculoskeletal: no proximal muscle weakness in upper or lower extremities  - Integumentary: + acanthosis nigricans, no abdominal striae, no rashes, no edema  - Neurological: reflexes 2+ at biceps, no tremor  - Psychiatric: normal mood and affect    Data Reviewed:   Component      Latest Ref Rng & Units 7/13/2017 7/13/2017          11:57 AM 11:57 AM   Glucose      65 - 99 mg/dL 106 (H)    BUN      8 - 27 mg/dL 13    Creatinine      0.76 - 1.27 mg/dL 1.02    GFR est non-AA      >59 mL/min/1.73 77    GFR est AA      >59 mL/min/1.73 89    BUN/Creatinine ratio      10 - 24 13    Sodium      134 - 144 mmol/L 142    Potassium      3.5 - 5.2 mmol/L 4.1    Chloride      96 - 106 mmol/L 100    CO2      18 - 29 mmol/L 26    Calcium      8.6 - 10.2 mg/dL 9.2    WBC      3.4 - 10.8 x10E3/uL  6.4   RBC      4.14 - 5.80 x10E6/uL  4.13 (L)   HGB      12.6 - 17.7 g/dL  11.0 (L)   HCT      37.5 - 51.0 %  35.5 (L)   MCV      79 - 97 fL  86   MCH      26.6 - 33.0 pg  26.6   MCHC      31.5 - 35.7 g/dL  31.0 (L)   RDW      12.3 - 15.4 %  15.0   PLATELET      234 - 073 x10E3/uL  287       Assessment/Plan:   1. Thyroid nodule    1) Pt has a very sizeable nodule on the left side of his neck. I don't feel evidence of LAD. Pt has US at [facility]. Will request these records to get an idea of the thyroid structure. Will check TSH today to ensure he does not have a toxic nodule. He is clinically euthyroid. Will send pt for FNA of the thyroid to look for evidence of malignancy. We discussed at length thyroid cancer and treatment options. We also discussed thyroidectomy vs hemithyroidectomy for treatment of symptomatic goiter if the FNA comes back benign. Pt voices understanding and agreement with the plan.     RTC 3 months    Patient Instructions        Thyroid Nodules: Care Instructions  Your Care Instructions  Thyroid nodules are growths or lumps in the thyroid gland. Your thyroid is in the front of your neck. It controls how your body uses energy. You may have tests to see if the nodule is caused by cancer. Most nodules aren't cancer and don't cause problems. Many don't even need treatment. If you do have cancer, it can usually be cured. Treatment will probably include surgery. You may also get radioactive iodine treatment. If your thyroid can't make thyroid hormone after treatment, you can take a pill every day to replace the hormone. Follow-up care is a key part of your treatment and safety. Be sure to make and go to all appointments, and call your doctor if you are having problems. It's also a good idea to know your test results and keep a list of the medicines you take. How can you care for yourself at home? · Be safe with medicines. If you take thyroid hormone medicine:  ¨ Take it exactly as prescribed. Call your doctor if you think you are having a problem with your medicine. If you take the right amount and don't skip doses, you probably won't have side effects. ¨ Do not take it with calcium, vitamins, or iron. ¨ Try not to miss a dose. ¨ Do not take extra doses. This will not help you get better any faster. It may also cause side effects. ¨ Tell your doctor about any medicines you take. This includes over-the-counter medicines. ¨ Wear a medical alert bracelet or necklace that says you take thyroid hormones. You can buy these at most drugstores. When should you call for help? Call 911 anytime you think you may need emergency care. For example, call if:  · You lose consciousness. Call your doctor now or seek immediate medical care if:  · You have shortness of breath. Watch closely for changes in your health, and be sure to contact your doctor if:  · You have pain in your neck, jaw, or ear. · You have problems swallowing. · You feel weak and tired.   · You have nervousness, a fast heartbeat, hand tremors, problems sleeping, increased sweating, and weight loss. · You do not feel better even though you are taking your medicine. Where can you learn more? Go to http://eric-denise.info/. Enter S339 in the search box to learn more about \"Thyroid Nodules: Care Instructions. \"  Current as of: January 31, 2017  Content Version: 11.3  © 6773-3837 [employer]. Care instructions adapted under license by [employer] (which disclaims liability or warranty for this information). If you have questions about a medical condition or this instruction, always ask your healthcare professional. [place] [address] any warranty or liability for your use of this information. Follow-up Disposition:  Return in about 3 months (around 11/21/2017). Copy sent to:  Jermain Marr Spindle

## 2017-08-21 NOTE — TELEPHONE ENCOUNTER
Returned patients call, verified identiy. Had BP readings to report as requested by Dr. Meek Camacho as follows:    8/14-8/18    150/90 ( average reading)  8/21- 146/86    Advised patient to call back later this week with more readings.

## 2017-08-21 NOTE — LETTER
8/21/2017 10:46 AM 
 
Patient:  Sebastien Pizarro YOB: 1950 Date of Visit: 8/21/2017 Dear Harjit Acosta MD 
[facility] 
[place] [address] [address] VIA Facsimile: [phone] Waqar Naranjo MD 
[address] [place] [address] [address] [address] VIA In Basket 
 : Thank you for referring Mr. Sebastien Pizarro to me for evaluation/treatment. Below are the relevant portions of my assessment and plan of care. If you have questions, please do not hesitate to call me. I look forward to following Mr. Nicol Loving along with you. Sincerely, Keke Luna MD

## 2017-08-21 NOTE — MR AVS SNAPSHOT
Visit Information Date & Time Provider Department Dept. Phone Encounter #  
 8/21/2017  9:50 AM Rigo Thompson, [address] [facility] [phone] [id number] Your Appointments 10/17/2017  1:00 PM  
ESTABLISHED PATIENT with Ronna Lozada MD  
[facility] ([address]) Appt Note: 2 month follow up  
 [address]  
[address] [facility] 7 [address] Upcoming Health Maintenance Date Due Hepatitis C Screening 1950 DTaP/Tdap/Td series (1 - Tdap) 12/21/1972 FOBT Q 1 YEAR AGE 50-75 12/21/2001 ZOSTER VACCINE AGE 60> 10/21/2011 GLAUCOMA SCREENING Q2Y 12/21/2016 Pneumococcal 65+ Low/Medium Risk (1 of 2 - PCV13) 12/21/2016 MEDICARE YEARLY EXAM 12/21/2016 INFLUENZA AGE 9 TO ADULT 8/1/2017 Allergies as of 8/21/2017  Review Complete On: 8/21/2017 By: Jacinto Segura LPN Severity Noted Reaction Type Reactions Lisinopril  08/21/2017    Other (comments) Altered kidney function Pcn [Penicillins]  07/13/2017    Rash Current Immunizations  Never Reviewed No immunizations on file. Not reviewed this visit You Were Diagnosed With   
  
 Codes Comments Thyroid nodule    -  Primary ICD-10-CM: E04.1 ICD-9-CM: 241.0 Vitals BP Pulse Weight(growth percentile) BMI Smoking Status 149/86 (BP 1 Location: Right arm, BP Patient Position: Sitting) 75 293 lb 3.2 oz (133 kg) 39.77 kg/m2 Never Smoker Vitals History BMI and BSA Data Body Mass Index Body Surface Area  
 39.77 kg/m 2 2.6 m 2 Preferred Pharmacy Pharmacy Name Phone [id number] [phone] Your Updated Medication List  
  
   
This list is accurate as of: 8/21/17 10:44 AM.  Always use your most recent med list.  
  
  
  
  
 carvedilol 25 mg tablet Commonly known as:  Pink Parrot Take 1 Tab by mouth two (2) times a day. fluticasone 50 mcg/actuation nasal spray Commonly known as:  Lilliana Samuel 2 Sprays by Both Nostrils route daily. furosemide 40 mg tablet Commonly known as:  LASIX Take 1 Tab by mouth daily. losartan 100 mg tablet Commonly known as:  COZAAR Take 1 Tab by mouth daily. warfarin 5 mg tablet Commonly known as:  COUMADIN Take 1 Tab by mouth as directed. We Performed the Following TSH 3RD GENERATION [61292 CPT(R)] To-Do List   
 08/21/2017 Imaging:  US GUIDE FINE NDL ASP W IMAGE Patient Instructions Thyroid Nodules: Care Instructions Your Care Instructions Thyroid nodules are growths or lumps in the thyroid gland. Your thyroid is in the front of your neck. It controls how your body uses energy. You may have tests to see if the nodule is caused by cancer. Most nodules aren't cancer and don't cause problems. Many don't even need treatment. If you do have cancer, it can usually be cured. Treatment will probably include surgery. You may also get radioactive iodine treatment. If your thyroid can't make thyroid hormone after treatment, you can take a pill every day to replace the hormone. Follow-up care is a key part of your treatment and safety. Be sure to make and go to all appointments, and call your doctor if you are having problems. It's also a good idea to know your test results and keep a list of the medicines you take. How can you care for yourself at home? · Be safe with medicines. If you take thyroid hormone medicine: ¨ Take it exactly as prescribed. Call your doctor if you think you are having a problem with your medicine. If you take the right amount and don't skip doses, you probably won't have side effects. ¨ Do not take it with calcium, vitamins, or iron. ¨ Try not to miss a dose. ¨ Do not take extra doses. This will not help you get better any faster. It may also cause side effects. ¨ Tell your doctor about any medicines you take. This includes over-the-counter medicines. ¨ Wear a medical alert bracelet or necklace that says you take thyroid hormones. You can buy these at most drugsEPSes. When should you call for help? Call 911 anytime you think you may need emergency care. For example, call if: 
· You lose consciousness. Call your doctor now or seek immediate medical care if: 
· You have shortness of breath. Watch closely for changes in your health, and be sure to contact your doctor if: 
· You have pain in your neck, jaw, or ear. · You have problems swallowing. · You feel weak and tired. · You have nervousness, a fast heartbeat, hand tremors, problems sleeping, increased sweating, and weight loss. · You do not feel better even though you are taking your medicine. Where can you learn more? Go to http://eric-denise.info/. Enter A301 in the search box to learn more about \"Thyroid Nodules: Care Instructions. \" Current as of: January 31, 2017 Content Version: 11.3 © 9355-2363 [employer]. Care instructions adapted under license by [employer] (which disclaims liability or warranty for this information). If you have questions about a medical condition or this instruction, always ask your healthcare professional. [address] any warranty or liability for your use of this information. Introducing \[facility] [place] [facility]\"" & HEALTH SERVICES! Ifrah Lott introduces [employer] patient portal. Now you can access parts of your medical record, email your doctor's office, and request medication refills online. 1. In your internet browser, go to [email or web address]. [employer] 2. Click on the First Time User? Click Here link in the Sign In box. You will see the New Member Sign Up page. 3. Enter your [employer] Access Code exactly as it appears below.  You will not need to use this code after youve completed the sign-up process. If you do not sign up before the expiration date, you must request a new code. · [email or web address] Access Code: [email or web address] Expires: 10/11/2017 11:48 AM 
 
4. Enter the last four digits of your Social Security Number (xxxx) and Date of Birth (mm/dd/yyyy) as indicated and click Submit. You will be taken to the next sign-up page. 5. Create a [email or web address] ID. This will be your [email or web address] login ID and cannot be changed, so think of one that is secure and easy to remember. 6. Create a [email or web address] password. You can change your password at any time. 7. Enter your Password Reset Question and Answer. This can be used at a later time if you forget your password. 8. Enter your e-mail address. You will receive e-mail notification when new information is available in [address] 9. Click Sign Up. You can now view and download portions of your medical record. 10. Click the Download Summary menu link to download a portable copy of your medical information. If you have questions, please visit the Frequently Asked Questions section of the [email or web address] website. Remember, [email or web address] is NOT to be used for urgent needs. For medical emergencies, dial 911. Now available from your iPhone and Android! Please provide this summary of care documentation to your next provider. Your primary care clinician is listed as Karson Squires. If you have any questions after today's visit, please call [phone].

## 2017-08-22 ENCOUNTER — TELEPHONE (OUTPATIENT)
Dept: CARDIOLOGY CLINIC | Age: 67
End: 2017-08-22

## 2017-08-22 ENCOUNTER — TELEPHONE (OUTPATIENT)
Dept: ENDOCRINOLOGY | Age: 67
End: 2017-08-22

## 2017-08-22 LAB — TSH SERPL DL<=0.005 MIU/L-ACNC: 1.03 UIU/ML (ref 0.45–4.5)

## 2017-08-22 NOTE — TELEPHONE ENCOUNTER
----- Message from Devonte Rudd sent at 8/22/2017 11:14 AM EDT -----  Regarding: Dr. Alisia Sharif  Patient has some questions about his test results and a possible biopsy. His number is [phone].

## 2017-08-22 NOTE — TELEPHONE ENCOUNTER
Please call Mr. Parisa Batista at [phone]. He'd like to know if he should be taking clonidine along with already taking carvedilol and losartan.       Thank you, Bhumika Carrillo

## 2017-08-22 NOTE — TELEPHONE ENCOUNTER
Returned patient's call, 2 pt identifiers used  Advised patient he is to add Clonidine to his list of medications. Continue taking all others as prescribed. Check blood pressure daily for week and call with results. Patient verbalized understanding.

## 2017-08-24 ENCOUNTER — TELEPHONE (OUTPATIENT)
Dept: CARDIOLOGY CLINIC | Age: 67
End: 2017-08-24

## 2017-08-24 NOTE — LETTER
8/25/2017 9:14 AM 
 
Mr. Patricia Davis 
[address] [address] Dear Dr. Prince Oliveira, Attention: Mary Newby Fax: [phone] Mr Hernandez Lauren is currently under the care of [address] He is low risk for cardiac complications during non-cardiac surgery. Would recommend he bridge with Lovenox since DVT was very recent, PCP has been overseeing coumadin. Patient may need to see vascular for possible Thorndike Filter for future  No further cardiac evaluation is indicated at this time. Please do not hesitate to contact me with questions. Sincerely, Jenny Oropeza MD

## 2017-08-24 NOTE — TELEPHONE ENCOUNTER
Gypsy from PCP Dr. Agustin Colvin office called regarding upcoming procedure patient will be having with Dr. Dunia Kramer. He is having a Thyroid biopsy on Wednesday 8/30/17. Dr. Dunia Kramer would like him to hold Coumadin 4 days prior. Dr. Agustin Colvin would like your clearance for holding Coumadin. Please advise. Fax clearance not to [facility] @ [phone]    2 pt identifiers used      Per Dr. Zan Cowan:    Was not on coumadin for cardiac reasons; was for DVT.  Would have him check with pt's PCP. [facility] need adonay filter if coumadin is held as DVT was very recent. Per Dr. Zan Cowan verbal order patient would need to bridge with Lovenox. PCP to oversee, however pt cleared from a cardiac stand point. Letter faxed.

## 2017-08-29 ENCOUNTER — TELEPHONE (OUTPATIENT)
Dept: ENDOCRINOLOGY | Age: 67
End: 2017-08-29

## 2017-08-29 NOTE — TELEPHONE ENCOUNTER
The treatment for thyroid cancer is to remove the cancer with surgery. Once the biopsy is back I will call  Hernandez Cleverly and let him know what is found.

## 2017-08-29 NOTE — TELEPHONE ENCOUNTER
Fuentes Jara, at [address] [facility] Department, called to clarify some information with you. Patient is scheduled to have an FNA tomorrow. Fuentes Jara can be reached at: [phone].

## 2017-08-29 NOTE — TELEPHONE ENCOUNTER
Spoke with Jojo Corado in [facility] radiology. She wanted to inform  that Fonnie Spurling would not be in tomorrow, in case patient needs Affirma testing. Dr. Osmar Lozoya will be in instead. Jojo Corado wonders if anything should be done prior to the FNA regarding patient's blood thinner. Spoke with . He states that patient will not need Affirma testing and because the needle puncture will be small, nothing special needs to be done regarding Coumadin. Jojo Corado states that Alfonso Morris will watch the patient closely after the FNA. Jojo Corado expressed understanding.

## 2017-08-29 NOTE — TELEPHONE ENCOUNTER
Patient is requesting a call back. He stated that he is scheduled to have a biopsy tomorrow and has a few questions. He can be reached at [phone].

## 2017-08-29 NOTE — TELEPHONE ENCOUNTER
Spoke with patient. He states that he is just curious and tends to worry about things. He wonders if the biopsy that he is having tomorrow comes back positive for cancer, can it be treated by anything other than surgery.

## 2017-08-30 ENCOUNTER — HOSPITAL ENCOUNTER (OUTPATIENT)
Dept: ULTRASOUND IMAGING | Age: 67
Discharge: HOME | End: 2017-08-30
Attending: INTERNAL MEDICINE
Payer: MEDICARE

## 2017-08-30 DIAGNOSIS — E04.1 THYROID NODULE: ICD-10-CM

## 2017-08-30 PROCEDURE — 10022 US GUIDE FINE NDL ASP W IMAGE: CPT

## 2017-08-30 PROCEDURE — 88173 CYTOPATH EVAL FNA REPORT: CPT | Performed by: INTERNAL MEDICINE

## 2017-08-30 PROCEDURE — 88172 CYTP DX EVAL FNA 1ST EA SITE: CPT | Performed by: INTERNAL MEDICINE

## 2017-08-30 RX ORDER — LIDOCAINE HYDROCHLORIDE 10 MG/ML
10 INJECTION INFILTRATION; PERINEURAL
Status: ACTIVE | OUTPATIENT
Start: 2017-08-30 | End: 2017-08-31

## 2017-09-01 ENCOUNTER — TELEPHONE (OUTPATIENT)
Dept: ENDOCRINOLOGY | Age: 67
End: 2017-09-01

## 2017-09-01 NOTE — TELEPHONE ENCOUNTER
Spoke with Mr. Lindy Powell about the thyroid pathology. There was no evidence of malignancy found. Will sit down with Mr. Lindy Powell in a couple of weeks to discuss treatment options. Pt voices understanding and agreement with the plan.

## 2017-09-08 ENCOUNTER — TELEPHONE (OUTPATIENT)
Dept: ENDOCRINOLOGY | Age: 67
End: 2017-09-08

## 2017-09-08 NOTE — TELEPHONE ENCOUNTER
Patient is requesting a call back. He stated that he spoke with Dr. Rudy Sam the other day regarding his results from the biopsy but he is confused. He can be reached at [phone].

## 2017-09-08 NOTE — TELEPHONE ENCOUNTER
Patient states that he could not remember if the biopsy was benign. Advised that there was no evidence of malignancy, per 's notation 9/1/17.

## 2017-09-15 ENCOUNTER — OFFICE VISIT (OUTPATIENT)
Dept: ENDOCRINOLOGY | Age: 67
End: 2017-09-15

## 2017-09-15 VITALS
WEIGHT: 289.2 LBS | HEART RATE: 78 BPM | BODY MASS INDEX: 39.17 KG/M2 | DIASTOLIC BLOOD PRESSURE: 100 MMHG | SYSTOLIC BLOOD PRESSURE: 169 MMHG | HEIGHT: 72 IN

## 2017-09-15 DIAGNOSIS — E04.2 MULTINODULAR GOITER (NONTOXIC): Primary | ICD-10-CM

## 2017-09-15 RX ORDER — TERBINAFINE HYDROCHLORIDE 250 MG/1
TABLET ORAL
Refills: 0 | COMMUNITY
Start: 2017-08-23

## 2017-09-15 NOTE — MR AVS SNAPSHOT
Visit Information Date & Time Provider Department Dept. Phone Encounter #  
 9/15/2017  2:00 PM Dayan Hardy, [address] [facility] Diabetes and Endocrinology [phone] Follow-up Instructions Return in about 3 months (around 12/15/2017). Your Appointments 10/17/2017  1:00 PM  
ESTABLISHED PATIENT with Robert Saul MD  
[facility] Appt Note: 2 month follow up  
 [address] [facility] [address]  
[address] [facility]  
  
    
 11/27/2017 11:50 AM  
Follow Up with MD Eleanor Fry Diabetes and Endocrinology [facility] Appt Note: f/u          d/m                 3 month  
 [address] [facility] [address] [address] [address] [place] [address]  
  
    
 12/28/2017  9:10 AM  
Follow Up with MD Eleanor Fry Diabetes and Endocrinology [facility]) Appt Note: f/u          dm            3 mo  
 [address] [facility] [address] [address] [phone] Upcoming Health Maintenance Date Due Hepatitis C Screening 1950 DTaP/Tdap/Td series (1 - Tdap) 12/21/1972 FOBT Q 1 YEAR AGE 50-75 12/21/2001 ZOSTER VACCINE AGE 60> 10/21/2011 GLAUCOMA SCREENING Q2Y 12/21/2016 Pneumococcal 65+ Low/Medium Risk (1 of 2 - PCV13) 12/21/2016 MEDICARE YEARLY EXAM 12/21/2016 INFLUENZA AGE 9 TO ADULT 8/1/2017 Allergies as of 9/15/2017  Review Complete On: 9/15/2017 By: Dayan Hardy MD  
  
 Severity Noted Reaction Type Reactions Lisinopril  08/21/2017    Other (comments) Altered kidney function Pcn [Penicillins]  07/13/2017    Rash Current Immunizations  Never Reviewed No immunizations on file. Not reviewed this visit You Were Diagnosed With   
  
 Codes Comments Multinodular goiter (nontoxic)    -  Primary ICD-10-CM: E95.3 ICD-9-CM: 449. 1 Vitals BP Pulse Height(growth percentile) Weight(growth percentile) BMI Smoking Status (!) 169/100 (BP 1 Location: Right arm, BP Patient Position: Sitting) 78 6' (1.829 m) 289 lb 3.2 oz (131.2 kg) 39.22 kg/m2 Never Smoker Vitals History BMI and BSA Data Body Mass Index Body Surface Area  
 39.22 kg/m 2 2.58 m 2 Preferred Pharmacy Pharmacy Name Phone [id number] [phone] Your Updated Medication List  
  
   
This list is accurate as of: 9/15/17  3:04 PM.  Always use your most recent med list.  
  
  
  
  
 carvedilol 25 mg tablet Commonly known as:  Christopher Hirschfeld Take 1 Tab by mouth two (2) times a day. cloNIDine HCl 0.1 mg tablet Commonly known as:  CATAPRES Take 1 Tab by mouth two (2) times a day. fluticasone 50 mcg/actuation nasal spray Commonly known as:  Sutton Capes 2 Sprays by Both Nostrils route daily. furosemide 40 mg tablet Commonly known as:  LASIX Take 1 Tab by mouth daily. losartan 100 mg tablet Commonly known as:  COZAAR Take 1 Tab by mouth daily. terbinafine HCl 250 mg tablet Commonly known as:  LAMISIL TAKE 1 TABLET BY MOUTH EVERY DAY AS DIRECTED  
  
 warfarin 5 mg tablet Commonly known as:  COUMADIN Take 1 Tab by mouth as directed. We Performed the Following REFERRAL TO GENERAL SURGERY [REF27 Custom] Follow-up Instructions Return in about 3 months (around 12/15/2017). Referral Information Referral ID Referred By Referred To  
  
 [id number] Minh Mario MD   
   [address] [place] [address], [address] Phone: [phone] Fax: [phone] Visits Status Start Date End Date 1 New Request 9/15/17 9/15/18 If your referral has a status of pending review or denied, additional information will be sent to support the outcome of this decision. Introducing [facility] & HEALTH SERVICES! Analisa Ngo introduces [employer] patient portal. Now you can access parts of your medical record, email your doctor's office, and request medication refills online. 1. In your internet browser, go to [email or web address] 2. Click on the First Time User? Click Here link in the Sign In box. You will see the New Member Sign Up page. 3. Enter your [employer] Access Code exactly as it appears below. You will not need to use this code after youve completed the sign-up process. If you do not sign up before the expiration date, you must request a new code. · [employer] Access Code: [email or web address] Expires: 10/11/2017 11:48 AM 
 
4. Enter the last four digits of your Social Security Number (xxxx) and Date of Birth (mm/dd/yyyy) as indicated and click Submit. You will be taken to the next sign-up page. 5. Create a [employer] ID. This will be your [employer] login ID and cannot be changed, so think of one that is secure and easy to remember. 6. Create a [employer] password. You can change your password at any time. 7. Enter your Password Reset Question and Answer. This can be used at a later time if you forget your password. 8. Enter your e-mail address. You will receive e-mail notification when new information is available in [address] 9. Click Sign Up. You can now view and download portions of your medical record. 10. Click the Download Summary menu link to download a portable copy of your medical information. If you have questions, please visit the Frequently Asked Questions section of the [employer] website. Remember, [employer] is NOT to be used for urgent needs. For medical emergencies, dial 911. Now available from your iPhone and Android! Please provide this summary of care documentation to your next provider. Your primary care clinician is listed as Cody Francois. If you have any questions after today's visit, please call [phone].

## 2017-09-15 NOTE — PROGRESS NOTES
Chief Complaint   Patient presents with    Thyroid Problem     pcp and pharmacy verified   Records since last visit reviewed  History of Present Illness: Chas Hernandez is a 73 y.o. male here for follow up of thyroid nodule. Pt notes that \"I had it looked at about 7 years ago and he told me it was ok and to not worry about it. \" He notes the size has gotten larger over the past few years and he wants to get it looked at. He first noted the goiter around 2007. He notes his brother had a thyroid problem and \"he had to have radiation\" (It sounds like he had MNG or Graves' treated with SCHMIDT). Pt has hx of prostate cancer, diagnosed 3 years ago. He was followed by Dr. Lexis Nobles of Urology. He was treated with surgery only, no chemo or radiation. No known family hx of cancers. Pt was [place], he has never lived outside the [place]. No known exposures to ionizing radiation. His Thyroid US at [facility] it showed a very large left sided goiter ([id number]) with a 6x8cm nodule. The isthmus was 1cm and the right lobe was 6x3x3, with two nodules (2.4x1. 5x1.5cm and 1.8x1. 6x1.4cm)  He has also had recent CXRs that show tracheal deviation due to the left sided thyromegaly. At our initial visit in August 2017 we assess his TFTs and they were normal, we then sent him for FNA of the dominate left nodule and the pathology came back benign. Pt notes he has been \"feeling a little paranoid since the biopsy\", but he denies pain, dysphagia, dysphonia or chocking. He notes he has some redness at the biopsy site, but that resolved. He notes occasional hoarseness and discomfort because of the large goiter. He does not have wheezing, chocking or symptoms concerning for respiratory compromise. He is followed by Dr. Fabio Singleton of cardiology for issues of HTN and CHF.     Current Outpatient Prescriptions   Medication Sig    terbinafine HCl (LAMISIL) 250 mg tablet TAKE 1 TABLET BY MOUTH EVERY DAY AS DIRECTED    cloNIDine HCl (CATAPRES) 0.1 mg tablet Take 1 Tab by mouth two (2) times a day.  warfarin (COUMADIN) 5 mg tablet Take 1 Tab by mouth as directed.  losartan (COZAAR) 100 mg tablet Take 1 Tab by mouth daily.  furosemide (LASIX) 40 mg tablet Take 1 Tab by mouth daily.  carvedilol (COREG) 25 mg tablet Take 1 Tab by mouth two (2) times a day.  fluticasone (FLONASE) 50 mcg/actuation nasal spray 2 Sprays by Both Nostrils route daily. No current facility-administered medications for this visit. Allergies   Allergen Reactions    Lisinopril Other (comments)     Altered kidney function    Pcn [Penicillins] Rash     Review of Systems:  - Cardiovascular: no chest pain  - Neurological: no tremors  - Integumentary: skin is normal    Physical Examination:  Blood pressure (!) 169/100, pulse 78, height 6' (1.829 m), weight 289 lb 3.2 oz (131.2 kg). - General: pleasant, no distress, good eye contact   - Neck: Pt has large left sided goiter, no LAD, no tenderness or pain  - Cardiovascular: regular, normal rate, nl s1 and s2, no m/r/g   - Integumentary: skin is normal, no edema  - Neurological: reflexes 2+ at biceps, no tremors  - Psychiatric: normal mood and affect    Data Reviewed:   - none new for review    Assessment/Plan:   1) Goiter > We discussed the treatment option being primarily surgical. He has a lot of anxiety about surgery. He has had surgeries in the past without complications, but he \"is afraid of going to sleep and not waking up. We discussed SCHMIDT, but I explained that this treatment is not as effective and could lead to hypothyroidism. He voiced understanding and he agreed to meet with Dr. Marcia Mabry to discuss the surgery and will make plans and decisions afterwards. If he does not go with surgery we will want to follow US every 6-12 months to watch for change and evidence of vascular or respiratory compromise. Will refer pt to Dr. Marcia Mabry for evaluation.       Follow-up Disposition:  Return in about 3 months (around 12/15/2017). We spent 40 minutes of face to face time together and > 50% of the time was spent in counseling on the above issues.      Copy sent to:  Dr. Maria Del Carmen Rich

## 2017-09-18 ENCOUNTER — TELEPHONE (OUTPATIENT)
Dept: SURGERY | Age: 67
End: 2017-09-18

## 2017-09-18 ENCOUNTER — TELEPHONE (OUTPATIENT)
Dept: ENDOCRINOLOGY | Age: 67
End: 2017-09-18

## 2017-09-18 ENCOUNTER — TELEPHONE (OUTPATIENT)
Dept: CARDIOLOGY CLINIC | Age: 67
End: 2017-09-18

## 2017-09-18 NOTE — TELEPHONE ENCOUNTER
----- Message from Jovanny Valera MD sent at 9/17/2017  8:48 PM EDT -----  Please call and see what BPs have been. If he does not have a BP cuff, recommend getting one. BP was very elevated at his endocrine appt and may need to restart back clonidine. Please also reconfirm meds he is taking at this time.   thx

## 2017-09-18 NOTE — TELEPHONE ENCOUNTER
Called patient, verified identity. Patient states he checks his BP daily at home, typically ranges from 120-140's over 70-80's. He does admit that he gets \"white coat syndrome\" when he goes out to the doctor office. He feels fine, denies any symptoms. Confirmed his appointment for 10/17 @ 1p.

## 2017-09-18 NOTE — TELEPHONE ENCOUNTER
Patient called to ask some questions about procedures he might need to have done for his thyroid goiter. He can be reached at:   [phone].

## 2017-09-19 NOTE — TELEPHONE ENCOUNTER
Spoke with patient. He wanted to know if there is anything by mouth he could take to reduce the enlarged thyroid. Per , there is nothing by mouth that can be taken to reduce the thyroid gland. Patient asked about SCHMIDT. Advised that per , the SCHMIDT is not the first choice to shrink a goiter. It MAY shrink it some, but would probably cause the thyroid to become underactive and the patient would require medication to replace the thyroid hormone. Patient expressed understanding.

## 2017-10-02 ENCOUNTER — OFFICE VISIT (OUTPATIENT)
Dept: SURGERY | Age: 67
End: 2017-10-02

## 2017-10-02 VITALS
HEIGHT: 72 IN | DIASTOLIC BLOOD PRESSURE: 106 MMHG | SYSTOLIC BLOOD PRESSURE: 173 MMHG | WEIGHT: 295 LBS | BODY MASS INDEX: 39.96 KG/M2 | OXYGEN SATURATION: 97 % | HEART RATE: 80 BPM

## 2017-10-02 DIAGNOSIS — E04.2 MULTINODULAR GOITER (NONTOXIC): Primary | ICD-10-CM

## 2017-10-02 DIAGNOSIS — I10 BENIGN ESSENTIAL HTN: ICD-10-CM

## 2017-10-02 DIAGNOSIS — I82.402 DEEP VEIN THROMBOSIS (DVT) OF LEFT LOWER EXTREMITY, UNSPECIFIED CHRONICITY, UNSPECIFIED VEIN (HCC): ICD-10-CM

## 2017-10-02 NOTE — MR AVS SNAPSHOT
Visit Information Date & Time Provider Department Dept. Phone Encounter #  
 10/2/2017 10:20 AM Wero Armenta MD [facility] [id number] Your Appointments 10/17/2017  1:00 PM  
ESTABLISHED PATIENT with Kyree Sifuentes MD  
[facility] ([facility] Appt Note: 2 month follow up  
 [address] [facility] [address]  
[address] [facility]  
  
    
 11/27/2017 11:50 AM  
Follow Up with MD Eleanor Mcpherson Diabetes and Endocrinology [facility]) Appt Note: f/u          d/m                 3 month  
 [address] [facility] [address] [address] [address] [place] [address]  
  
    
 12/28/2017  9:10 AM  
Follow Up with MD Ren Mcphersonton Diabetes and Endocrinology [facility]) Appt Note: f/u          dm            3 mo  
 [address] [facility] [address] [address] [phone] Upcoming Health Maintenance Date Due Hepatitis C Screening 1950 DTaP/Tdap/Td series (1 - Tdap) 12/21/1972 FOBT Q 1 YEAR AGE 50-75 12/21/2001 ZOSTER VACCINE AGE 60> 10/21/2011 GLAUCOMA SCREENING Q2Y 12/21/2016 Pneumococcal 65+ Low/Medium Risk (1 of 2 - PCV13) 12/21/2016 MEDICARE YEARLY EXAM 12/21/2016 INFLUENZA AGE 9 TO ADULT 8/1/2017 Allergies as of 10/2/2017  Review Complete On: 10/2/2017 By: Wero Armenta MD  
  
 Severity Noted Reaction Type Reactions Lisinopril  08/21/2017    Other (comments) Altered kidney function Pcn [Penicillins]  07/13/2017    Rash Current Immunizations  Never Reviewed No immunizations on file. Not reviewed this visit Vitals BP Pulse Height(growth percentile) Weight(growth percentile) SpO2 BMI (!) 173/106 (BP 1 Location: Left arm, BP Patient Position: Sitting) 80 6' (1.829 m) 295 lb (133.8 kg) 97% 40.01 kg/m2 Smoking Status Never Smoker Vitals History BMI and BSA Data Body Mass Index Body Surface Area 40.01 kg/m 2 2.61 m 2 Preferred Pharmacy Pharmacy Name Phone [id number] [phone] Your Updated Medication List  
  
   
This list is accurate as of: 10/2/17 11:18 AM.  Always use your most recent med list.  
  
  
  
  
 carvedilol 25 mg tablet Commonly known as:  Gianni [place] Take 1 Tab by mouth two (2) times a day. cloNIDine HCl 0.1 mg tablet Commonly known as:  CATAPRES Take 1 Tab by mouth two (2) times a day. fluticasone 50 mcg/actuation nasal spray Commonly known as:  Leblanc Blizzard 2 Sprays by Both Nostrils route daily. furosemide 40 mg tablet Commonly known as:  LASIX Take 1 Tab by mouth daily. losartan 100 mg tablet Commonly known as:  COZAAR Take 1 Tab by mouth daily. terbinafine HCl 250 mg tablet Commonly known as:  LAMISIL TAKE 1 TABLET BY MOUTH EVERY DAY AS DIRECTED  
  
 warfarin 5 mg tablet Commonly known as:  COUMADIN Take 1 Tab by mouth as directed. Introducing hospitals & HEALTH SERVICES! Odilia Gloria introduces [employer] patient portal. Now you can access parts of your medical record, email your doctor's office, and request medication refills online. 1. In your internet browser, go to [email or web address] 2. Click on the First Time User? Click Here link in the Sign In box. You will see the New Member Sign Up page. 3. Enter your [employer] [facility] Access Code exactly as it appears below. You will not need to use this code after youve completed the sign-up process. If you do not sign up before the expiration date, you must request a new code. · [facility] [facility] Access Code: [email or web address] Expires: 10/11/2017 11:48 AM 
 
 4. Enter the last four digits of your Social Security Number (xxxx) and Date of Birth (mm/dd/yyyy) as indicated and click Submit. You will be taken to the next sign-up page. 5. Create a [employer] ID. This will be your [employer] login ID and cannot be changed, so think of one that is secure and easy to remember. 6. Create a [employer] password. You can change your password at any time. 7. Enter your Password Reset Question and Answer. This can be used at a later time if you forget your password. 8. Enter your e-mail address. You will receive e-mail notification when new information is available in [address] 9. Click Sign Up. You can now view and download portions of your medical record. 10. Click the Download Summary menu link to download a portable copy of your medical information. If you have questions, please visit the Frequently Asked Questions section of the [employer] website. Remember, [employer] is NOT to be used for urgent needs. For medical emergencies, dial 911. Now available from your iPhone and Android! Please provide this summary of care documentation to your next provider. Your primary care clinician is listed as Bharathi Vogel. If you have any questions after today's visit, please call [phone].

## 2017-10-02 NOTE — PROGRESS NOTES
HISTORY OF PRESENT ILLNESS  Jyotsna Haney is a 73 y.o. male who comes in for consultation by Dr Valdez Klein for a goiter  HPI  He has had a left neck mass for about 10 years. Recently it has been getting larger. He reports some mild dysphagia and neck pressure when laying flat. His last US was 6/19/2017 at [facility] and the left lobe measured 11.8 x 8.8 x 6.0 cm with a 5.8 x 7.7 cm dominant mass and right side measured 5.8 x 2.7 x 3.0 cm with two 2+ masses. FNA was benign. He has tracheal deviation on CXR. He developed a DVT in June 2017 and underwent thrombolysis and is now on coumadin. He denies palpitations, unexplained weight loss/gain, cold intolerance or family hx thyroid disease. He was euthyroid in Aug 2017. 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 Past Medical History:   Diagnosis Date    DVT (deep venous thrombosis) ([facility] 06/2017    left lower leg    Goiter     Hypertension     Prostate cancer ([facility] [address]      Past Surgical History:   Procedure Laterality Date    HX KNEE ARTHROSCOPY      Rt knee    HX PROSTATECTOMY       Family History   Problem Relation Age of Onset    Arthritis-osteo Mother     Heart Failure Father     Diabetes Brother     Thyroid Disease Brother     Other Maternal Grandmother      MS    No Known Problems Maternal Grandfather     No Known Problems Paternal Grandmother     No Known Problems Paternal Grandfather     Cancer Neg Hx      Social History   Substance Use Topics    Smoking status: Never Smoker    Smokeless tobacco: Never Used    Alcohol use No      Comment: in the past     Current Outpatient Prescriptions   Medication Sig    terbinafine HCl (LAMISIL) 250 mg tablet TAKE 1 TABLET BY MOUTH EVERY DAY AS DIRECTED    cloNIDine HCl (CATAPRES) 0.1 mg tablet Take 1 Tab by mouth two (2) times a day.  warfarin (COUMADIN) 5 mg tablet Take 1 Tab by mouth as directed.     losartan (COZAAR) 100 mg tablet Take 1 Tab by mouth daily.  furosemide (LASIX) 40 mg tablet Take 1 Tab by mouth daily.  carvedilol (COREG) 25 mg tablet Take 1 Tab by mouth two (2) times a day.  fluticasone (FLONASE) 50 mcg/actuation nasal spray 2 Sprays by Both Nostrils route daily. No current facility-administered medications for this visit. Allergies   Allergen Reactions    Lisinopril Other (comments)     Altered kidney function    Pcn [Penicillins] Rash       Review of Systems   Constitutional: Negative for chills, diaphoresis, fever, malaise/fatigue and weight loss. HENT: Negative for congestion, ear pain and sore throat. Eyes: Negative for blurred vision and pain. Respiratory: Negative for cough, hemoptysis, sputum production, shortness of breath, wheezing and stridor. Cardiovascular: Positive for leg swelling. Negative for chest pain, palpitations, orthopnea, claudication and PND. Gastrointestinal: Negative for abdominal pain, blood in stool, constipation, diarrhea, heartburn, melena, nausea and vomiting. Genitourinary: Negative for dysuria, flank pain, frequency, hematuria and urgency. Musculoskeletal: Negative for back pain, joint pain, myalgias and neck pain. Skin: Negative for itching and rash. Neurological: Negative for dizziness, tremors, focal weakness, seizures, weakness and headaches. Endo/Heme/Allergies: Negative for polydipsia. Psychiatric/Behavioral: Negative for depression and memory loss. The patient is not nervous/anxious. Visit Vitals    BP (!) 173/106 (BP 1 Location: Left arm, BP Patient Position: Sitting)    Pulse 80    Ht 6' (1.829 m)    Wt 133.8 kg (295 lb)    SpO2 97%    BMI 40.01 kg/m2       Physical Exam   Constitutional: He is oriented to person, place, and time. He appears well-developed and well-nourished. No distress. HENT:   Head: Normocephalic and atraumatic.    Mouth/Throat: Oropharynx is clear and moist. No oropharyngeal exudate. Eyes: Conjunctivae and EOM are normal. Pupils are equal, round, and reactive to light. No scleral icterus. Neck: Trachea normal, normal range of motion and full passive range of motion without pain. Neck supple. No tracheal tenderness present. No rigidity. No tracheal deviation, no edema and no erythema present. Thyroid mass and thyromegaly present. dysphonia   Cardiovascular: Normal rate, regular rhythm and normal heart sounds. Exam reveals no gallop and no friction rub. No murmur heard. Pulmonary/Chest: Effort normal and breath sounds normal. No stridor. No respiratory distress. He has no wheezes. He has no rales. Abdominal: Soft. Normal appearance and bowel sounds are normal. He exhibits no distension, no pulsatile liver and no mass. There is no hepatosplenomegaly. There is no tenderness. There is no rebound, no guarding and no CVA tenderness. No hernia. Hernia confirmed negative in the right inguinal area and confirmed negative in the left inguinal area. Genitourinary: Testes normal. Cremasteric reflex is present. Circumcised. Musculoskeletal: Normal range of motion. He exhibits edema (BLE). He exhibits no tenderness. Lymphadenopathy:     He has no cervical adenopathy. Right: No inguinal adenopathy present. Left: No inguinal adenopathy present. Neurological: He is alert and oriented to person, place, and time. No cranial nerve deficit. Coordination normal.   Skin: Skin is warm and dry. No rash noted. He is not diaphoretic. No erythema. Psychiatric: He has a normal mood and affect. His behavior is normal. Judgment and thought content normal.       ASSESSMENT and PLAN  1.  Large multinodular goiter. I explained about the anatomy and pathophysiology of thyroid nodules/disease. I explained about possible malignancy. I discussed FNA, observation, possible thyroid suppression pending FNA, and total thyroidectomy.   Risks of surgery include bleeding (potentially requiring emergent exploration at bedside), infection, parathyroid injury/removal requiring supplemental calcium +/- vit d, recurrent laryngeal/superior laryngeal nerve injury resulting in vocal cord paralysis, voice changes and fatigue, aspiration, further surgery, need for lifelong thyroid supplementation. At this point, I feel that he likely has some irrepairible voice changes but is at increased risk of complications given the thyroid size and tracheal deviation  2. Obstructive sleep apnea. Work up to get machine in progress  3. Hx cardiomyopathy. Followed by Dr Anil Samuel  4. Hx DVT. On 3 of 6-9 months of coumadin. Would like to hold on surgery for at least 6 months total  5. Morbid obesity. Increased risks of complications. Recommended diet modification and exercise    He is very anxious of the idea of surgery. He would be willing to proceed after the first of the year which would be 6 months from the DVT.   He has f/u with Dr Anil Samuel next month    Chester Figueroa MD FACS

## 2017-10-05 ENCOUNTER — TELEPHONE (OUTPATIENT)
Dept: ENDOCRINOLOGY | Age: 67
End: 2017-10-05

## 2017-10-05 NOTE — TELEPHONE ENCOUNTER
Spoke with patient. He states that he saw Dr. Yara Terry on 10/2/17. 1.He states that  mentioned \"something\", slightly puffy, on the other side of his neck that did not appear \"normal\". He did not recall that  said anything about that. He is wondering what it it. 2. recommended taking out the total thyroid. He is concerned about how it will affect his body. 3. Patient states is wondering about the radiation therapy vs surgery. He wonders how the radiation would affect his body internally. Risks vs advantages.

## 2017-10-05 NOTE — TELEPHONE ENCOUNTER
Patient is calling to inform your of a his recent visit to Dr. Valdemar Guzmán. He has questions pertaining the visit. Patient contact :  [phone].

## 2017-10-05 NOTE — TELEPHONE ENCOUNTER
1) From reading Dr. Jessee Weinstein notes all I see is the mention of the enlarged thyroid. This puffiness on the other side of the neck was the thyroid and the tissue that has been displaced because of the large thyroid mass. 2) With the thyroid gone I can easily replace the thyroid hormone with a pill, that is the only long term effect from having the thyroid removed. 3) I feel the radiation will not be very effective in treating this mass. The risk is low thyroid, and there is concern of injury to the tear ducts and salivary glands, which can lead to dry eye and dry mouth. I would not recommend the radiation. The surgery will be more effective.

## 2017-10-30 ENCOUNTER — OFFICE VISIT (OUTPATIENT)
Dept: CARDIOLOGY CLINIC | Age: 67
End: 2017-10-30

## 2017-10-30 VITALS
HEIGHT: 72 IN | RESPIRATION RATE: 16 BRPM | WEIGHT: 295.2 LBS | BODY MASS INDEX: 39.98 KG/M2 | DIASTOLIC BLOOD PRESSURE: 94 MMHG | HEART RATE: 66 BPM | SYSTOLIC BLOOD PRESSURE: 140 MMHG

## 2017-10-30 DIAGNOSIS — R60.0 BILATERAL EDEMA OF LOWER EXTREMITY: ICD-10-CM

## 2017-10-30 DIAGNOSIS — E04.2 MULTINODULAR GOITER (NONTOXIC): ICD-10-CM

## 2017-10-30 DIAGNOSIS — I10 BENIGN ESSENTIAL HTN: ICD-10-CM

## 2017-10-30 DIAGNOSIS — I82.502 CHRONIC DEEP VEIN THROMBOSIS (DVT) OF LEFT LOWER EXTREMITY, UNSPECIFIED VEIN (HCC): ICD-10-CM

## 2017-10-30 DIAGNOSIS — I42.9 CARDIOMYOPATHY, UNSPECIFIED TYPE (HCC): Primary | ICD-10-CM

## 2017-10-30 NOTE — LETTER
3. Neck mass. Followed by Dr. Jarred Noonan and he is trying to decide on the options going forward. 4. Second hand smoke. 5. Left lower extremity DVT, status post thrombolysis. He is on Coumadin and there was some discussion about how long he should be on Coumadin. He cannot identify any exacerbating factors like surgery or long plane/car ride. My opinion, without identifiable risk factor and he has limited mobility, he is at risk for recurrent DVT off oral anticoagulation. Probably should take coumadin long term. He will talk to his other doctors about this as well. Will have him follow back here in six months. If you have questions, please do not hesitate to call me. Sincerely, Gary Perales MD

## 2017-10-30 NOTE — MR AVS SNAPSHOT
Visit Information Date & Time Provider Department Dept. Phone Encounter #  
 10/30/2017 11:20 AM Jyoti Garcia MD [facility] Monique Palacio [phone] [id number] Your Appointments 11/27/2017 11:50 AM  
Follow Up with MD Eleanor [facility]) Appt Note: f/u          d/m                 3 month  
 [address] [id number] [address] [place] [address]  
  
    
 12/28/2017  9:10 AM  
Follow Up with MD Eleanor [facility]) Appt Note: f/u          dm            3 mo  
 [address] [id number] [phone]  
  
    
 1/15/2018 10:40 AM  
ESTABLISHED PATIENT with Dorys De Leon MD  
[facility] Dr. Gurinder Flowers [facility] ([facility]) Appt Note: Lt sided goiter discuss surgery [address] [place] [address], [address] [address]  
[address] [facility], [address] [place] [address] [address] [address] Upcoming Health Maintenance Date Due Hepatitis C Screening 1950 DTaP/Tdap/Td series (1 - Tdap) 12/21/1972 FOBT Q 1 YEAR AGE 50-75 12/21/2001 ZOSTER VACCINE AGE 60> 10/21/2011 GLAUCOMA SCREENING Q2Y 12/21/2016 Pneumococcal 65+ Low/Medium Risk (1 of 2 - PCV13) 12/21/2016 MEDICARE YEARLY EXAM 12/21/2016 INFLUENZA AGE 9 TO ADULT 8/1/2017 Allergies as of 10/30/2017  Review Complete On: 10/30/2017 By: Jyoti Garcia MD  
  
 Severity Noted Reaction Type Reactions Lisinopril  08/21/2017    Other (comments) Altered kidney function Pcn [Penicillins]  07/13/2017    Rash Current Immunizations  Never Reviewed No immunizations on file. Not reviewed this visit Vitals BP Pulse Resp Height(growth percentile) Weight(growth percentile) BMI (!) 140/94 (BP 1 Location: Left arm, BP Patient Position: Sitting) 66 16 6' (1.829 m) 295 lb 3.2 oz (133.9 kg) 40.04 kg/m2 Smoking Status Never Smoker Vitals History BMI and BSA Data Body Mass Index Body Surface Area 40.04 kg/m 2 2.61 m 2 Preferred Pharmacy Pharmacy Name Phone [id number] [phone] Your Updated Medication List  
  
   
This list is accurate as of: 10/30/17 12:01 PM.  Always use your most recent med list.  
  
  
  
  
 carvedilol 25 mg tablet Commonly known as:  Sanjuana Dome Take 1 Tab by mouth two (2) times a day. cloNIDine HCl 0.1 mg tablet Commonly known as:  CATAPRES Take 1 Tab by mouth two (2) times a day. fluticasone 50 mcg/actuation nasal spray Commonly known as:  Sesar [place] 2 Sprays by Both Nostrils route daily. furosemide 40 mg tablet Commonly known as:  LASIX Take 1 Tab by mouth daily. losartan 100 mg tablet Commonly known as:  COZAAR Take 1 Tab by mouth daily. terbinafine HCl 250 mg tablet Commonly known as:  LAMISIL TAKE 1 TABLET BY MOUTH EVERY DAY AS DIRECTED  
  
 warfarin 5 mg tablet Commonly known as:  COUMADIN Take 1 Tab by mouth as directed. Introducing [facility]! [facility] introduces appAttach patient portal. Now you can access parts of your medical record, email your doctor's office, and request medication refills online. 1. In your internet browser, go to [email or web address] 2. Click on the First Time User? Click Here link in the Sign In box. You will see the New Member Sign Up page. 3. Enter your appAttach Access Code exactly as it appears below. You will not need to use this code after youve completed the sign-up process. If you do not sign up before the expiration date, you must request a new code. · appAttach Access Code: [email or web address] Expires: 1/28/2018 12:00 PM 
 4. Enter the last four digits of your Social Security Number (xxxx) and Date of Birth (mm/dd/yyyy) as indicated and click Submit. You will be taken to the next sign-up page. 5. Create a [employer] ID. This will be your [employer] login ID and cannot be changed, so think of one that is secure and easy to remember. 6. Create a [employer] password. You can change your password at any time. 7. Enter your Password Reset Question and Answer. This can be used at a later time if you forget your password. 8. Enter your e-mail address. You will receive e-mail notification when new information is available in [address] 9. Click Sign Up. You can now view and download portions of your medical record. 10. Click the Download Summary menu link to download a portable copy of your medical information. If you have questions, please visit the Frequently Asked Questions section of the [employer] website. Remember, [employer] is NOT to be used for urgent needs. For medical emergencies, dial 911. Now available from your iPhone and Android! Please provide this summary of care documentation to your next provider. Your primary care clinician is listed as Trudi Hemphill. If you have any questions after today's visit, please call [phone].

## 2017-10-30 NOTE — PROGRESS NOTES
[place]: [place]  [phone]    History of Present Illness:   Mr. Dorene Romo is a 72 yo M followed in the past by Dr. eJanie Young with a history of what sounds to be combined diastolic and systolic CHF with an EF of 45-50% in 2013 (in 2009 of 35-40% felt to be due to hypertension), essential hypertension, chronic lower extremity edema. 3/2017 echo was normal with an EF 60%, stress test no ischemia. LLE DVT 6/2017 had lysis and started on coumadin. Since his last visit, he says he has been feeling okay. His breathing has been stable with no chest pains. No significant palpitations. No bleeding issues on Coumadin. With regard to his neck mass, it was getting larger and he noted some dysphagia and neck discomfort. He was seeing Dr. Zenia Rosen about this and they recommended surgical resection of the mass. He has some hesitancy about this and is trying to think about the options going forward. Apparently FNAs have been benign and he had tracheal deviation on chest x-ray. With regard to his lymphedema, he is being followed by Dr. Mayra Abdalla.   He is compensated on exam with clear lungs. He has chronic lower extremity edema and 2-3+ bilateral lower extremity edema. His weight is overall unchanged at 295 pounds. His blood pressure was mildly elevated at 140/94 here, but it has been okay at home. Assessment and Plan:   1. Cardiomyopathy. Resolved and has normal LV function by echocardiogram and stress test done earlier this year. Continues compensated. Most consistent with cardiomyopathy due to hypertension. 2. Essential hypertension. Blood pressure is overall well controlled. He does have some white coat hypertension, but has had normal resting blood pressures at home. Will continue his current meds. 3. Neck mass. Followed by Dr. Zenia Rosen and he is trying to decide on the options going forward. 4. Second hand smoke. 5. Left lower extremity DVT, status post thrombolysis.   He is on Coumadin and there was some discussion about how long he should be on Coumadin. He cannot identify any exacerbating factors like surgery or long plane/car ride. My opinion, without identifiable risk factor and he has limited mobility, he is at risk for recurrent DVT off oral anticoagulation. Probably should take coumadin long term. He will talk to his other doctors about this as well. Will have him follow back here in six months. He  has a past medical history of DVT (deep venous thrombosis) ([facility] [address] (06/2017); Goiter; Hypertension; and [facility] All other systems negative except as above. PE  Vitals:    10/30/17 1126   BP: (!) 140/94   Pulse: 66   Resp: 16   Weight: 295 lb 3.2 oz (133.9 kg)   Height: 6' (1.829 m)    Body mass index is 40.04 kg/(m^2).    General appearance - alert, well appearing, and in no distress  Mental status - affect appropriate to mood  Eyes - sclera anicteric, moist mucous membranes  Neck - supple, no JVD  Chest - clear to auscultation, no wheezes, rales or rhonchi  Heart - normal rate, regular rhythm, normal S1, S2, no murmurs, rubs, clicks or gallops  Abdomen - soft, obese, nondistended, no masses or organomegaly  Neurological -no focal deficit  Extremities - peripheral pulses normal, 1-2+ LE pedal edema      Recent Labs:  Lab Results   Component Value Date/Time    Cholesterol, total 199 07/11/2009 04:45 AM    HDL Cholesterol 53 07/11/2009 04:45 AM    LDL, calculated 131.6 07/11/2009 04:45 AM    Triglyceride 72 07/11/2009 04:45 AM    CHOL/HDL Ratio 3.8 07/11/2009 04:45 AM     Lab Results   Component Value Date/Time    Creatinine (POC) 1.2 07/06/2009 10:30 PM    Creatinine 1.02 07/13/2017 11:57 AM     Lab Results   Component Value Date/Time    BUN 13 07/13/2017 11:57 AM    BUN (POC) 12 07/06/2009 10:30 PM     Lab Results   Component Value Date/Time    Potassium 4.1 07/13/2017 11:57 AM     Lab Results   Component Value Date/Time    Hemoglobin A1c 6.2 07/11/2009 04:45 AM     Lab Results   Component Value Date/Time    Hemoglobin (POC) 15.3 07/06/2009 10:30 PM    HGB 11.0 07/13/2017 11:57 AM     Lab Results   Component Value Date/Time    PLATELET 089 85/21/6385 11:57 AM       Reviewed:  Past Medical History:   Diagnosis Date    DVT (deep venous thrombosis) ([facility] [address] 06/2017    left lower leg    Goiter     Hypertension     Prostate cancer ([facility] [address]      History   Smoking Status    Never Smoker   Smokeless Tobacco    Never Used     History   Alcohol Use No     Comment: in the past     Allergies   Allergen Reactions    Lisinopril Other (comments)     Altered kidney function    Pcn [Penicillins] Rash       Current Outpatient Prescriptions   Medication Sig    terbinafine HCl (LAMISIL) 250 mg tablet TAKE 1 TABLET BY MOUTH EVERY DAY AS DIRECTED    cloNIDine HCl (CATAPRES) 0.1 mg tablet Take 1 Tab by mouth two (2) times a day.  warfarin (COUMADIN) 5 mg tablet Take 1 Tab by mouth as directed.  losartan (COZAAR) 100 mg tablet Take 1 Tab by mouth daily.  furosemide (LASIX) 40 mg tablet Take 1 Tab by mouth daily.  carvedilol (COREG) 25 mg tablet Take 1 Tab by mouth two (2) times a day.  fluticasone (FLONASE) 50 mcg/actuation nasal spray 2 Sprays by Both Nostrils route daily. No current facility-administered medications for this visit.         MD Juancarlos Mccarty [facility] [place]  [address] [facility] [address]  [address]

## 2017-12-27 ENCOUNTER — TELEPHONE (OUTPATIENT)
Dept: ENDOCRINOLOGY | Age: 67
End: 2017-12-27

## 2017-12-27 NOTE — TELEPHONE ENCOUNTER
Patient has a scheduled appointment tomorrow with Dr. Haley Real, but has been seeing another endocrinologist and thinks he should cancel this appointment with Dr. Haley Real. He has a few questions to ask first.    He can be reached at:   [phone].

## 2017-12-27 NOTE — TELEPHONE ENCOUNTER
Called and spoke with patient. Patient states that he received a second opinion from another endocrinologist and will start seeing this endocrinologist from no on (patient unable to recall physicians name). Patient canceled appointment with Dr. Jose Webb for 12/28/17.

## 2017-12-28 ENCOUNTER — TELEPHONE (OUTPATIENT)
Dept: CARDIOLOGY CLINIC | Age: 67
End: 2017-12-28

## 2017-12-28 NOTE — TELEPHONE ENCOUNTER
Ya Franz with [facility] with Dr. Link Mejia is calling for cardiac clearance for a left tyroid lobectomy on 1/5/18. Ya Franz can be reached at [phone]. FAX: [phone].     Thank you,  Anant Villar

## 2017-12-28 NOTE — LETTER
12/29/2017 8:55 AM 
 
Mr. Rita Oconnell 
[address] [address] To whom it may concern, 
 
Mr. Whitfield is a patient of Dr. Devante Lewis. The following is his recommendation for his upcoming procedure: 
 
  Pt is stable cardiac wise and low risk for cardiac complications.  Anticoagulation can be held 5-7 days prior; restarted after. If you have any questions, please contact the office. Sincerely, Latoya Gonzales MD

## 2018-02-27 ENCOUNTER — OFFICE VISIT (OUTPATIENT)
Dept: CARDIOLOGY CLINIC | Age: 68
End: 2018-02-27

## 2018-02-27 VITALS
BODY MASS INDEX: 40.77 KG/M2 | DIASTOLIC BLOOD PRESSURE: 80 MMHG | HEIGHT: 72 IN | HEART RATE: 89 BPM | WEIGHT: 301 LBS | SYSTOLIC BLOOD PRESSURE: 120 MMHG | RESPIRATION RATE: 16 BRPM | OXYGEN SATURATION: 98 %

## 2018-02-27 DIAGNOSIS — I10 BENIGN ESSENTIAL HTN: ICD-10-CM

## 2018-02-27 DIAGNOSIS — R00.2 HEART PALPITATIONS: Primary | ICD-10-CM

## 2018-02-27 DIAGNOSIS — R60.0 BILATERAL EDEMA OF LOWER EXTREMITY: ICD-10-CM

## 2018-02-27 DIAGNOSIS — I42.9 CARDIOMYOPATHY, UNSPECIFIED TYPE (HCC): ICD-10-CM

## 2018-02-27 DIAGNOSIS — E66.01 OBESITY, MORBID (HCC): ICD-10-CM

## 2018-02-27 NOTE — LETTER
3/1/2018 10:20 AM 
 
Patient:  Alistair Mccurdy. YOB: 1950 Date of Visit: 2/27/2018 Dear Claudene Silva, MD 
[facility] [address] VIA Facsimile: [phone] 
 : 
 
Mr. Pro Seen is a 77 yo M followed in the past by Dr. Nafisa Head with a history of what sounds to be combined diastolic and systolic CHF with an EF of 45-50% in 2013 (in 2009 of 35-40% felt to be due to hypertension), essential hypertension, chronic lower extremity edema. 3/2017 echo was normal with an EF 60%, stress test no ischemia. LLE DVT 6/2017 had lysis and started on coumadin. He says he had a recent [place] to [place] weeks ago for elevated blood pressure and some \"sticking sensation in his neck\". He does say that he had eaten a big bowl of soup that he thought may have been associated. Yesterday, he also had some \"fluttering\" sensation in his chest.  His breathing has been stable. He denies any exertional chest pain. He does say that his blood pressure was up there, but today it is normal at 120/80. He is compensated on exam with clear lungs, chronic 2-3+ bilateral lower extremity edema. Assessment and Plan: 1. Palpitations. Will obtain a 24 hour Holter for further evaluation. 2. Cardiomyopathy. Resolved with normal LV function by echocardiogram and normal stress test.  He is overall compensated here and no changes made. 3. Essential hypertension. Blood pressure is controlled. 4. Second hand smoke. 5. Left lower extremity DVT, status post thrombolysis. If you have questions, please do not hesitate to call me. Sincerely, Brijesh Michelle MD

## 2018-02-27 NOTE — MR AVS SNAPSHOT
[address] [address] 
[phone] Patient: Maya Irizarry. MRN: [id number] Visit Information Date & Time Provider Department Dept. Phone Encounter #  
 2/27/2018  3:40 PM Petra Moody MD [facility] Marquise Mcduffie [phone] [id number] Upcoming Health Maintenance Date Due Hepatitis C Screening 1950 DTaP/Tdap/Td series (1 - Tdap) 12/21/1972 FOBT Q 1 YEAR AGE 50-75 12/21/2001 ZOSTER VACCINE AGE 60> 10/21/2011 GLAUCOMA SCREENING Q2Y 12/21/2016 Pneumococcal 65+ Low/Medium Risk (1 of 2 - PCV13) 12/21/2016 MEDICARE YEARLY EXAM 12/21/2016 Influenza Age 5 to Adult 8/1/2017 Allergies as of 2/27/2018  Review Complete On: 2/27/2018 By: Bertis Merlin Severity Noted Reaction Type Reactions Lisinopril  08/21/2017    Other (comments) Altered kidney function Pcn [Penicillins]  07/13/2017    Rash Current Immunizations  Never Reviewed No immunizations on file. Not reviewed this visit Vitals BP Pulse Resp Height(growth percentile) Weight(growth percentile) SpO2  
 120/80 (BP 1 Location: Left arm, BP Patient Position: Sitting) 89 16 6' (1.829 m) 301 lb (136.5 kg) 98% BMI Smoking Status 40.82 kg/m2 Never Smoker Vitals History BMI and BSA Data Body Mass Index Body Surface Area  
 40.82 kg/m 2 2.63 m 2 Preferred Pharmacy Pharmacy Name Phone [id number] [place] [phone] Your Updated Medication List  
  
   
This list is accurate as of 2/27/18  4:23 PM.  Always use your most recent med list.  
  
  
  
  
 carvedilol 25 mg tablet Commonly known as:  Carri Partida Take 1 Tab by mouth two (2) times a day. cloNIDine HCl 0.1 mg tablet Commonly known as:  CATAPRES Take 1 Tab by mouth two (2) times a day. fluticasone 50 mcg/actuation nasal spray Commonly known as:  Lorry Sinning 2 Sprays by Both Nostrils route daily. furosemide 40 mg tablet Commonly known as:  LASIX Take 1 Tab by mouth daily. losartan 100 mg tablet Commonly known as:  COZAAR Take 1 Tab by mouth daily. terbinafine HCl 250 mg tablet Commonly known as:  LAMISIL TAKE 1 TABLET BY MOUTH EVERY DAY AS DIRECTED  
  
 warfarin 5 mg tablet Commonly known as:  COUMADIN Take 1 Tab by mouth as directed. Introducing [facility] [place] [facility]! [employer] introduces [employer] patient portal. Now you can access parts of your medical record, email your doctor's office, and request medication refills online. 1. In your internet browser, go to [email or web address] 2. Click on the First Time User? Click Here link in the Sign In box. You will see the New Member Sign Up page. 3. Enter your [employer] Access Code exactly as it appears below. You will not need to use this code after youve completed the sign-up process. If you do not sign up before the expiration date, you must request a new code. · [employer] Access Code: [email or web address] Expires: 5/28/2018  4:23 PM 
 
4. Enter the last four digits of your Social Security Number (xxxx) and Date of Birth (mm/dd/yyyy) as indicated and click Submit. You will be taken to the next sign-up page. 5. Create a [employer] ID. This will be your [employer] login ID and cannot be changed, so think of one that is secure and easy to remember. 6. Create a [employer] password. You can change your password at any time. 7. Enter your Password Reset Question and Answer. This can be used at a later time if you forget your password. 8. Enter your e-mail address. You will receive e-mail notification when new information is available in [address] 9. Click Sign Up. You can now view and download portions of your medical record. 10. Click the Download Summary menu link to download a portable copy of your medical information. If you have questions, please visit the Frequently Asked Questions section of the [employer] website. Remember, [employer] is NOT to be used for urgent needs. For medical emergencies, dial 911. Now available from your iPhone and Android! Please provide this summary of care documentation to your next provider. Your primary care clinician is listed as Leward Phoenix. If you have any questions after today's visit, please call [phone].

## 2018-02-27 NOTE — PROGRESS NOTES
[place] [address]:   [phone]    History of Present Illness:   Mr. Rosalba Esqueda is a 77 yo M followed in the past by Dr. Raul Klein with a history of what sounds to be combined diastolic and systolic CHF with an EF of 45-50% in 2013 (in 2009 of 35-40% felt to be due to hypertension), essential hypertension, chronic lower extremity edema. 3/2017 echo was normal with an EF 60%, stress test no ischemia. LLE DVT 6/2017 had lysis and started on coumadin. He says he had a recent visit to [facility] a few weeks ago for elevated blood pressure and some \"sticking sensation in his neck\". He does say that he had eaten a big bowl of soup that he thought may have been associated. Yesterday, he also had some \"fluttering\" sensation in his chest.  His breathing has been stable. He denies any exertional chest pain. He does say that his blood pressure was up there, but today it is normal at 120/80. He is compensated on exam with clear lungs, chronic 2-3+ bilateral lower extremity edema. Assessment and Plan:   1. Palpitations. Will obtain a 24 hour Holter for further evaluation. 2. Cardiomyopathy. Resolved with normal LV function by echocardiogram and normal stress test.  He is overall compensated here and no changes made. 3. Essential hypertension. Blood pressure is controlled. 4. Second hand smoke. 5. Left lower extremity DVT, status post thrombolysis. He  has a past medical history of DVT (deep venous thrombosis) ([facility] [address] (06/2017); Goiter; Hypertension; and Prostate cancer ([facility] [address] All other systems negative except as above. PE  Vitals:    02/27/18 1545   BP: 120/80   Pulse: 89   Resp: 16   SpO2: 98%   Weight: 301 lb (136.5 kg)   Height: 6' (1.829 m)    Body mass index is 40.82 kg/(m^2).    General appearance - alert, well appearing, and in no distress  Mental status - affect appropriate to mood  Eyes - sclera anicteric, moist mucous membranes  Neck - supple, no JVD  Chest - clear to auscultation, no wheezes, rales or rhonchi  Heart - normal rate, regular rhythm, normal S1, S2, no murmurs, rubs, clicks or gallops  Abdomen - soft, obese, nondistended, no masses or organomegaly  Neurological -no focal deficit  Extremities - peripheral pulses normal, 1-2+ LE pedal edema      Recent Labs:  Lab Results   Component Value Date/Time    Cholesterol, total 199 07/11/2009 04:45 AM    HDL Cholesterol 53 07/11/2009 04:45 AM    LDL, calculated 131.6 (H) 07/11/2009 04:45 AM    Triglyceride 72 07/11/2009 04:45 AM    CHOL/HDL Ratio 3.8 07/11/2009 04:45 AM     Lab Results   Component Value Date/Time    Creatinine (POC) 1.2 07/06/2009 10:30 PM    Creatinine 1.02 07/13/2017 11:57 AM     Lab Results   Component Value Date/Time    BUN 13 07/13/2017 11:57 AM    BUN (POC) 12 07/06/2009 10:30 PM     Lab Results   Component Value Date/Time    Potassium 4.1 07/13/2017 11:57 AM     Lab Results   Component Value Date/Time    Hemoglobin A1c 6.2 (H) 07/11/2009 04:45 AM     Lab Results   Component Value Date/Time    Hemoglobin (POC) 15.3 07/06/2009 10:30 PM    HGB 11.0 (L) 07/13/2017 11:57 AM     Lab Results   Component Value Date/Time    PLATELET 726 61/50/4264 11:57 AM       Reviewed:  Past Medical History:   Diagnosis Date    [facility]2017    left lower leg    Goiter     Hypertension     Prostate cancer ([facility]      History   Smoking Status    Never Smoker   Smokeless Tobacco    Never Used     History   Alcohol Use No     Comment: in the past     Allergies   Allergen Reactions    Lisinopril Other (comments)     Altered kidney function    Pcn [Penicillins] Rash       Current Outpatient Prescriptions   Medication Sig    cloNIDine HCl (CATAPRES) 0.1 mg tablet Take 1 Tab by mouth two (2) times a day.  warfarin (COUMADIN) 5 mg tablet Take 1 Tab by mouth as directed.  losartan (COZAAR) 100 mg tablet Take 1 Tab by mouth daily.  furosemide (LASIX) 40 mg tablet Take 1 Tab by mouth daily.  carvedilol (COREG) 25 mg tablet Take 1 Tab by mouth two (2) times a day.  fluticasone (FLONASE) 50 mcg/actuation nasal spray 2 Sprays by Both Nostrils route daily.  terbinafine HCl (LAMISIL) 250 mg tablet TAKE 1 TABLET BY MOUTH EVERY DAY AS DIRECTED     No current facility-administered medications for this visit.         Suman Cavazos MD  [facility] heart and Vascular [place]  [address] [facility] [address]  [address]

## 2018-03-13 ENCOUNTER — CLINICAL SUPPORT (OUTPATIENT)
Dept: CARDIOLOGY CLINIC | Age: 68
End: 2018-03-13

## 2018-03-13 DIAGNOSIS — I10 BENIGN ESSENTIAL HTN: ICD-10-CM

## 2018-03-13 DIAGNOSIS — R00.2 HEART PALPITATIONS: ICD-10-CM

## 2018-03-13 DIAGNOSIS — E66.01 OBESITY, MORBID (HCC): ICD-10-CM

## 2018-03-13 DIAGNOSIS — I42.9 CARDIOMYOPATHY, UNSPECIFIED TYPE (HCC): ICD-10-CM

## 2018-03-13 DIAGNOSIS — R60.0 BILATERAL EDEMA OF LOWER EXTREMITY: ICD-10-CM

## 2018-03-16 ENCOUNTER — TELEPHONE (OUTPATIENT)
Dept: CARDIOLOGY CLINIC | Age: 68
End: 2018-03-16

## 2018-03-16 DIAGNOSIS — I10 BENIGN ESSENTIAL HTN: Primary | ICD-10-CM

## 2018-03-19 RX ORDER — DILTIAZEM HYDROCHLORIDE 120 MG/1
120 CAPSULE, COATED, EXTENDED RELEASE ORAL
Qty: 30 CAP | Refills: 11 | Status: SHIPPED | OUTPATIENT
Start: 2018-03-19 | End: 2018-04-20 | Stop reason: SDUPTHER

## 2018-03-19 RX ORDER — DILTIAZEM HYDROCHLORIDE 120 MG/1
120 CAPSULE, COATED, EXTENDED RELEASE ORAL
COMMUNITY
End: 2018-03-19 | Stop reason: SDUPTHER

## 2018-03-19 NOTE — TELEPHONE ENCOUNTER
LVM for patient to return call at earliest convenience. MD Martina Abbott, RN        Caller: Unspecified (3 days ago,  3:15 PM)                     Would let pt know holter noted occasional to frequent benign PVCs.  Would switch clonidine to cardizem  mg every day.  Call with BP 1 week. thx       Patient returned call and above message given. He will stop Clonidine and replace with Cardizem D 120 mg nightly. Call with BP in 1 week. 2 pt identifiers used    Requested Prescriptions     Signed Prescriptions Disp Refills    dilTIAZem CD (CARDIZEM CD) 120 mg ER capsule 30 Cap 11     Sig: Take 1 Cap by mouth nightly.      Authorizing Provider: Meagan Lora     Ordering User: Monique Shetty

## 2018-03-22 ENCOUNTER — TELEPHONE (OUTPATIENT)
Dept: CARDIOLOGY CLINIC | Age: 68
End: 2018-03-22

## 2018-03-22 NOTE — TELEPHONE ENCOUNTER
Patient would like a call back regarding the new medication Diltiazem that they put him on.    Phone [phone]

## 2018-03-23 NOTE — TELEPHONE ENCOUNTER
Returned patient's call, 2 pt identifiers used    Patient states he has been monitoring his BP with taking new medication Cardizem. It has not been too low, running around 130's-140's. His palpitations have gotten much better. Some sleepiness with the medication but seems to be improving. He does take medication in the evening. He will continue to monitor his symptoms and will call back with any changes. Follow up as scheduled.

## 2018-03-30 ENCOUNTER — TELEPHONE (OUTPATIENT)
Dept: CARDIOLOGY CLINIC | Age: 68
End: 2018-03-30

## 2018-03-30 NOTE — TELEPHONE ENCOUNTER
Would take an extra cardizem cd 120. thx    Called patient. Verified identity. Informed him of Dr. Marci Alfaro order to take an extra Cardizem 120 mg tonight. Patient acknowledged understanding.

## 2018-03-30 NOTE — TELEPHONE ENCOUNTER
Patient called states his BP is elevated today. He admits that he most likely forgot to take his medication last night. Reports /90 today, has not come down after morning meds. States he is very concerned about it and would like  to advise him. Informed him that I will forward this message for review and call him back with a response.

## 2018-04-13 ENCOUNTER — TELEPHONE (OUTPATIENT)
Dept: CARDIOLOGY CLINIC | Age: 68
End: 2018-04-13

## 2018-04-13 DIAGNOSIS — I42.9 CARDIOMYOPATHY, UNSPECIFIED TYPE (HCC): Primary | ICD-10-CM

## 2018-04-13 DIAGNOSIS — I10 ESSENTIAL HYPERTENSION: ICD-10-CM

## 2018-04-13 NOTE — TELEPHONE ENCOUNTER
Returned call to patient. Verified identity. Patient had questions regarding his recent heart monitor results. Reviewed the findings with him Patient states that he was previously taking Potassium but not currently. He is still taking Lasix and has concerns over his Potassium level and if it is low. He does not have any recent lab work. He is requesting that labs be drawn to see if he needs to be back on Potassium. Informed him that Dr. Nina Daly is out of the office today, but I will forward this information to him and get back to him with a response.

## 2018-04-13 NOTE — TELEPHONE ENCOUNTER
Patient called regarding his diltiazem, he has a few question. He can be reached at [phone].  Thank you

## 2018-04-16 NOTE — TELEPHONE ENCOUNTER
Patient returned my call. Verified identity. Informed him that Dr. Shyam Garcia would like him to have a BMP.  Patient was agreeable and states he will stop by tomorrow for the lab slip and go to [facility].

## 2018-04-17 ENCOUNTER — HOSPITAL ENCOUNTER (OUTPATIENT)
Dept: LAB | Age: 68
Discharge: HOME | End: 2018-04-17
Payer: MEDICARE

## 2018-04-17 PROCEDURE — 36415 COLL VENOUS BLD VENIPUNCTURE: CPT

## 2018-04-17 PROCEDURE — 80048 BASIC METABOLIC PNL TOTAL CA: CPT

## 2018-04-18 LAB
BUN SERPL-MCNC: 11 MG/DL (ref 8–27)
BUN/CREAT SERPL: 12 (ref 10–24)
CALCIUM SERPL-MCNC: 9.2 MG/DL (ref 8.6–10.2)
CHLORIDE SERPL-SCNC: 101 MMOL/L (ref 96–106)
CO2 SERPL-SCNC: 26 MMOL/L (ref 18–29)
CREAT SERPL-MCNC: 0.9 MG/DL (ref 0.76–1.27)
GFR SERPLBLD CREATININE-BSD FMLA CKD-EPI: 103 ML/MIN/1.73
GFR SERPLBLD CREATININE-BSD FMLA CKD-EPI: 89 ML/MIN/1.73
GLUCOSE SERPL-MCNC: 125 MG/DL (ref 65–99)
POTASSIUM SERPL-SCNC: 4.1 MMOL/L (ref 3.5–5.2)
SODIUM SERPL-SCNC: 142 MMOL/L (ref 134–144)

## 2018-04-19 ENCOUNTER — TELEPHONE (OUTPATIENT)
Dept: CARDIOLOGY CLINIC | Age: 68
End: 2018-04-19

## 2018-04-19 NOTE — TELEPHONE ENCOUNTER
I spoke to patient regarding his concerns over fluttering and palpitations he is feeling again. They seemed to have improved after he started Dilt 120, ( also on Coreg 25 mg bid) but now seem to be coming back. He states he did cut back on caffeine and his blood pressure running between 126-601 systolic. Please advise any changes. 2 pt identifiers used      Per Dr. Roma Beavers advised patient to increase Diltiazem to 180 mg. Requested Prescriptions     Signed Prescriptions Disp Refills    dilTIAZem CD (CARDIZEM CD) 180 mg ER capsule 90 Cap 3     Sig: Take 1 Cap by mouth nightly.      Authorizing Provider: Jose Montejo     Ordering User: Gela Santos     2 pt identifiers used

## 2018-04-20 RX ORDER — DILTIAZEM HYDROCHLORIDE 180 MG/1
180 CAPSULE, COATED, EXTENDED RELEASE ORAL
Qty: 90 CAP | Refills: 3 | Status: SHIPPED | OUTPATIENT
Start: 2018-04-20 | End: 2018-08-28 | Stop reason: SDUPTHER

## 2018-06-11 ENCOUNTER — TELEPHONE (OUTPATIENT)
Dept: CARDIOLOGY CLINIC | Age: 68
End: 2018-06-11

## 2018-06-11 NOTE — TELEPHONE ENCOUNTER
Patient has some questions regarding his medication Diltiazem CD. Patient feels like it may not be working.   Phone [phone]  Jojo Corado

## 2018-06-11 NOTE — TELEPHONE ENCOUNTER
Returned patient's call, 2 pt identifiers used  Patient's states his blood pressure has been ranging 130-140's/70-80's. I will notify patient if any changes are needed.

## 2018-08-28 ENCOUNTER — OFFICE VISIT (OUTPATIENT)
Dept: CARDIOLOGY CLINIC | Age: 68
End: 2018-08-28

## 2018-08-28 VITALS
HEART RATE: 82 BPM | DIASTOLIC BLOOD PRESSURE: 98 MMHG | OXYGEN SATURATION: 96 % | WEIGHT: 305 LBS | HEIGHT: 73 IN | SYSTOLIC BLOOD PRESSURE: 146 MMHG | BODY MASS INDEX: 40.42 KG/M2

## 2018-08-28 DIAGNOSIS — R60.0 BILATERAL EDEMA OF LOWER EXTREMITY: ICD-10-CM

## 2018-08-28 DIAGNOSIS — I10 BENIGN ESSENTIAL HTN: ICD-10-CM

## 2018-08-28 DIAGNOSIS — E66.01 OBESITY, MORBID (HCC): ICD-10-CM

## 2018-08-28 DIAGNOSIS — I42.9 CARDIOMYOPATHY, UNSPECIFIED TYPE (HCC): Primary | ICD-10-CM

## 2018-08-28 RX ORDER — DILTIAZEM HYDROCHLORIDE 240 MG/1
240 CAPSULE, COATED, EXTENDED RELEASE ORAL
Qty: 90 CAP | Refills: 3 | Status: SHIPPED | OUTPATIENT
Start: 2018-08-28 | End: 2019-06-19 | Stop reason: SDUPTHER
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
## 2018-08-28 NOTE — MR AVS SNAPSHOT
[address] [facility] [address] [facility] 
[phone] Patient: Kelly Becker. MRN: [id number] Visit Information Date & Time Provider Department Dept. Phone Encounter #  
 8/28/2018  1:20 PM Jeremi Elmore MD [facility] Prakash Kirby [phone] [id number] Your Appointments 2/28/2019 11:20 AM  
ESTABLISHED PATIENT with Jeremi Elmore MD  
[facility] ([address]) Appt Note: 6 mo f/u per Dr. Yon Ryan 200 [address] [facility]  
[address] [facility] 7 [address] Upcoming Health Maintenance Date Due Hepatitis C Screening 1950 DTaP/Tdap/Td series (1 - Tdap) 12/21/1972 FOBT Q 1 YEAR AGE 50-75 12/21/2001 ZOSTER VACCINE AGE 60> 10/21/2011 GLAUCOMA SCREENING Q2Y 12/21/2016 Pneumococcal 65+ Low/Medium Risk (1 of 2 - PCV13) 12/21/2016 MEDICARE YEARLY EXAM 3/14/2018 Influenza Age 5 to Adult 8/1/2018 Allergies as of 8/28/2018  Review Complete On: 2/27/2018 By: Jeremi Elmore MD  
  
 Severity Noted Reaction Type Reactions Lisinopril  08/21/2017    Other (comments) Altered kidney function Pcn [Penicillins]  07/13/2017    Rash Current Immunizations  Never Reviewed No immunizations on file. Not reviewed this visit Vitals BP Pulse Height(growth percentile) Weight(growth percentile) SpO2 BMI  
 (!) 146/98 (BP 1 Location: Right arm, BP Patient Position: Sitting) 82 6' 1\" (1.854 m) 305 lb (138.3 kg) 96% 40.24 kg/m2 Smoking Status Never Smoker Vitals History BMI and BSA Data Body Mass Index Body Surface Area  
 40.24 kg/m 2 2.67 m 2 Preferred Pharmacy Pharmacy Name Phone [id number] [phone] Your Updated Medication List  
  
   
 This list is accurate as of 8/28/18  2:23 PM.  Always use your most recent med list.  
  
  
  
  
 carvedilol 25 mg tablet Commonly known as:  Cleatis Conrado Take 1 Tab by mouth two (2) times a day. dilTIAZem  mg ER capsule Commonly known as:  CARDIZEM CD Take 1 Cap by mouth nightly. fluticasone 50 mcg/actuation nasal spray Commonly known as:  Radha [place] 2 Sprays by Both Nostrils route daily. furosemide 40 mg tablet Commonly known as:  LASIX Take 1 Tab by mouth daily. losartan 100 mg tablet Commonly known as:  COZAAR  
TAKE 1 TABLET BY MOUTH EVERY DAY  
  
 terbinafine HCl 250 mg tablet Commonly known as:  LAMISIL TAKE 1 TABLET BY MOUTH EVERY DAY AS DIRECTED  
  
 warfarin 5 mg tablet Commonly known as:  COUMADIN Take 1 Tab by mouth as directed. Introducing [facility]! [employer] introduces [employer] patient portal. Now you can access parts of your medical record, email your doctor's office, and request medication refills online. 1. In your internet browser, go to [email or web address] 2. Click on the First Time User? Click Here link in the Sign In box. You will see the New Member Sign Up page. 3. Enter your [employer] Access Code exactly as it appears below. You will not need to use this code after youve completed the sign-up process. If you do not sign up before the expiration date, you must request a new code. · [employer] Access Code: [email or web address] Expires: 11/26/2018  2:23 PM 
 
4. Enter the last four digits of your Social Security Number (xxxx) and Date of Birth (mm/dd/yyyy) as indicated and click Submit. You will be taken to the next sign-up page. 5. Create a [employer] ID. This will be your [employer] login ID and cannot be changed, so think of one that is secure and easy to remember. 6. Create a [employer]met password. You can change your password at any time. 7. Enter your Password Reset Question and Answer. This can be used at a later time if you forget your password. 8. Enter your e-mail address. You will receive e-mail notification when new information is available in [address] 9. Click Sign Up. You can now view and download portions of your medical record. 10. Click the Download Summary menu link to download a portable copy of your medical information. If you have questions, please visit the Frequently Asked Questions section of the [employer] website. Remember, [employer] is NOT to be used for urgent needs. For medical emergencies, dial 911. Now available from your iPhone and Android! Please provide this summary of care documentation to your next provider. Your primary care clinician is listed as Maru Carrillo. If you have any questions after today's visit, please call [phone].

## 2018-09-24 RX ORDER — CARVEDILOL 25 MG/1
25 TABLET ORAL 2 TIMES DAILY
Qty: 60 TAB | Refills: 11 | Status: SHIPPED | OUTPATIENT
Start: 2018-09-24 | End: 2019-10-10 | Stop reason: SDUPTHER

## 2018-09-24 RX ORDER — CARVEDILOL 25 MG/1
25 TABLET ORAL 2 TIMES DAILY
Qty: 60 TAB | Refills: 11 | OUTPATIENT
Start: 2018-09-24

## 2019-02-28 ENCOUNTER — OFFICE VISIT (OUTPATIENT)
Dept: CARDIOLOGY CLINIC | Age: 69
End: 2019-02-28

## 2019-02-28 VITALS
WEIGHT: 313.6 LBS | SYSTOLIC BLOOD PRESSURE: 150 MMHG | HEIGHT: 73 IN | BODY MASS INDEX: 41.56 KG/M2 | RESPIRATION RATE: 16 BRPM | DIASTOLIC BLOOD PRESSURE: 100 MMHG

## 2019-02-28 DIAGNOSIS — E66.01 OBESITY, MORBID (HCC): ICD-10-CM

## 2019-02-28 DIAGNOSIS — I42.9 CARDIOMYOPATHY, UNSPECIFIED TYPE (HCC): Primary | ICD-10-CM

## 2019-02-28 DIAGNOSIS — R60.0 EDEMA OF LEFT LOWER EXTREMITY: ICD-10-CM

## 2019-02-28 DIAGNOSIS — I10 BENIGN ESSENTIAL HTN: ICD-10-CM

## 2019-02-28 NOTE — PROGRESS NOTES
[address]: [place]  [phone]    History of Present Illness:   Mr. Laurie Birones is a 77 yo M followed in the past by Dr. Himanshu Ca with a history of what sounds to be combined diastolic and systolic CHF with an EF of 45-50% in 2013 (in 2009 of 35-40% felt to be due to hypertension), essential hypertension, chronic lower extremity edema. 3/2017 echo was normal with an EF 60%, stress test no ischemia. LLE DVT 6/2017 had lysis and started on coumadin. Since his last visit, he has been doing well. He denies any exertional chest pain. He does have baseline shortness of breath that he attributes to his weight that is unchanged. He denies any significant palpitations, lightheadedness or dizziness. He has been compliant with his medications. He did ask if he could take Viagra and I think this is okay. He is interested in a weight loss program and will try to refer him to one. He is compensated on exam with clear lungs. He does have chronic lower extremity edema. His blood pressure here is elevated at 150/100, but it has been normal on recheck at home. Assessment and Plan:   1. Cardiomyopathy. Resolved with normal LV function by echocardiogram and a normal stress test.  Continues stable and compensated. 2. Essential hypertension. Blood pressure is controlled and will have him follow back in six months. 3. Morbid obesity. He is trying to work on weight loss and encouraged this. He is going to try to enroll in a weight loss program and encouraged this. 4. Second hand smoke. 5. Left lower extremity DVT, status post thrombolysis. 6. PVCs. He  has a past medical history of DVT (deep venous thrombosis) ([facility] (06/2017), Goiter, Hypertension, and Prostate cancer ([facility] [facility] All other systems negative except as above. PE  Vitals:    02/28/19 1151   BP: (!) 150/100   Resp: 16   Weight: 313 lb 9.6 oz (142.2 kg)   Height: 6' 1\" (1.854 m)    Body mass index is 41.37 kg/m².    General appearance - alert, well appearing, and in no distress  Mental status - affect appropriate to mood  Eyes - sclera anicteric, moist mucous membranes  Neck - supple, no JVD  Chest - clear to auscultation, no wheezes, rales or rhonchi  Heart - normal rate, regular rhythm, normal S1, S2, no murmurs, rubs, clicks or gallops  Abdomen - soft, obese, nondistended, no masses or organomegaly  Neurological -no focal deficit  Extremities - peripheral pulses normal, 1-2+ LE pedal edema      Recent Labs:  Lab Results   Component Value Date/Time    Cholesterol, total 199 07/11/2009 04:45 AM    HDL Cholesterol 53 07/11/2009 04:45 AM    LDL, calculated 131.6 (H) 07/11/2009 04:45 AM    Triglyceride 72 07/11/2009 04:45 AM    CHOL/HDL Ratio 3.8 07/11/2009 04:45 AM     Lab Results   Component Value Date/Time    Creatinine (POC) 1.2 07/06/2009 10:30 PM    Creatinine 0.90 04/17/2018 12:00 AM     Lab Results   Component Value Date/Time    BUN 11 04/17/2018 12:00 AM    BUN (POC) 12 07/06/2009 10:30 PM     Lab Results   Component Value Date/Time    Potassium 4.1 04/17/2018 12:00 AM     Lab Results   Component Value Date/Time    Hemoglobin A1c 6.2 (H) 07/11/2009 04:45 AM     Lab Results   Component Value Date/Time    Hemoglobin (POC) 15.3 07/06/2009 10:30 PM    HGB 11.0 (L) 07/13/2017 11:57 AM     Lab Results   Component Value Date/Time    PLATELET 770 09/63/0456 11:57 AM       Reviewed:  Past Medical History:   Diagnosis Date    DVT (deep venous thrombosis) ([facility] [address] 06/2017    left lower leg    Goiter     Hypertension     Prostate cancer ([facility] [address]      Social History     Tobacco Use   Smoking Status Never Smoker   Smokeless Tobacco Never Used     Social History     Substance and Sexual Activity   Alcohol Use No    Comment: in the past     Allergies   Allergen Reactions    Lisinopril Other (comments)     Altered kidney function    Pcn [Penicillins] Rash       Current Outpatient Medications   Medication Sig    carvedilol (COREG) 25 mg tablet Take 1 Tab by mouth two (2) times a day.  dilTIAZem CD (CARDIZEM CD) 240 mg ER capsule Take 1 Cap by mouth nightly.  losartan (COZAAR) 100 mg tablet TAKE 1 TABLET BY MOUTH EVERY DAY    terbinafine HCl (LAMISIL) 250 mg tablet TAKE 1 TABLET BY MOUTH EVERY DAY AS DIRECTED    warfarin (COUMADIN) 5 mg tablet Take 1 Tab by mouth as directed.  furosemide (LASIX) 40 mg tablet Take 1 Tab by mouth daily.  fluticasone (FLONASE) 50 mcg/actuation nasal spray 2 Sprays by Both Nostrils route daily. No current facility-administered medications for this visit.         Gallito Carballo MD  [facility] heart and Vascular [place]  [address] [facility] [address]  [address]

## 2019-02-28 NOTE — LETTER
2/28/2019 5:29 PM 
 
Patient:  Maria Teresa Linn. YOB: 1950 Date of Visit: 2/28/2019 Dear Lexa Motley MD 
[facility] 7 [address] VIA Facsimile: [phone] 
 : 
 
Mr. Donald Conner is a 77 yo M followed in the past by Dr. Ada Michel with a history of what sounds to be combined diastolic and systolic CHF with an EF of 45-50% in 2013 (in 2009 of 35-40% felt to be due to hypertension), essential hypertension, chronic lower extremity edema. 3/2017 echo was normal with an EF 60%, stress test no ischemia. LLE DVT 6/2017 had lysis and started on coumadin. Since his last visit, he has been doing well. He denies any exertional chest pain. He does have baseline shortness of breath that he attributes to his weight that is unchanged. He denies any significant palpitations, lightheadedness or dizziness. He has been compliant with his medications. He did ask if he could take Viagra and I think this is okay. He is interested in a weight loss program and will try to refer him to one. He is compensated on exam with clear lungs. He does have chronic lower extremity edema. His blood pressure here is elevated at 150/100, but it has been normal on recheck at home. Assessment and Plan: 1. Cardiomyopathy. Resolved with normal LV function by echocardiogram and a normal stress test.  Continues stable and compensated. 2. Essential hypertension. Blood pressure is controlled and will have him follow back in six months. 3. Morbid obesity. He is trying to work on weight loss and encouraged this. He is going to try to enroll in a weight loss program and encouraged this. 4. Second hand smoke. 5. Left lower extremity DVT, status post thrombolysis. 6. PVCs. If you have questions, please do not hesitate to call me. Sincerely, Katlyn Harmon MD

## 2019-03-18 ENCOUNTER — TELEPHONE (OUTPATIENT)
Dept: CARDIOLOGY CLINIC | Age: 69
End: 2019-03-18

## 2019-03-18 NOTE — TELEPHONE ENCOUNTER
Returned call to patient and LVM his target HR should be no more than 130. He may return call with any questions.

## 2019-03-18 NOTE — TELEPHONE ENCOUNTER
Patient states that he just bought an exercise bike and wants to verify what should be his max heart rate when exercising.     He would like a call back at [phone]

## 2019-04-22 ENCOUNTER — TELEPHONE (OUTPATIENT)
Dept: CARDIOLOGY CLINIC | Age: 69
End: 2019-04-22

## 2019-04-23 NOTE — TELEPHONE ENCOUNTER
Patient is calling regarding losartan. He states that he has not heard anything back and would like a call. Please advise.     Phone #: [phone]  Thanks

## 2019-04-24 ENCOUNTER — TELEPHONE (OUTPATIENT)
Dept: CARDIOLOGY CLINIC | Age: 69
End: 2019-04-24

## 2019-04-24 NOTE — TELEPHONE ENCOUNTER
Would continue losartan. thx     2 pt identifiers used  Pt.  Informed & verbalized his understanding

## 2019-04-29 RX ORDER — FUROSEMIDE 20 MG/1
TABLET ORAL
Qty: 30 TAB | Refills: 0 | Status: SHIPPED | OUTPATIENT
Start: 2019-04-29 | End: 2020-02-21 | Stop reason: SDUPTHER

## 2019-04-29 NOTE — TELEPHONE ENCOUNTER
Pharmacy confirmed. Patient is out of the medication. He would also like a call back to discuss possible side effects that he may have experienced with this medication.    Phone: [phone]

## 2019-05-08 RX ORDER — LOSARTAN POTASSIUM 100 MG/1
TABLET ORAL
Qty: 90 TAB | Refills: 2 | Status: SHIPPED | OUTPATIENT
Start: 2019-05-08 | End: 2020-11-06

## 2019-06-20 RX ORDER — DILTIAZEM HYDROCHLORIDE 240 MG/1
CAPSULE, COATED, EXTENDED RELEASE ORAL
Qty: 90 CAP | Refills: 0 | Status: SHIPPED | OUTPATIENT
Start: 2019-06-20 | End: 2019-09-17 | Stop reason: SDUPTHER

## 2019-06-20 NOTE — TELEPHONE ENCOUNTER
Requested Prescriptions     Signed Prescriptions Disp Refills    dilTIAZem CD (CARDIZEM CD) 240 mg ER capsule 90 Cap 0     Sig: TAKE 1 CAPSULE BY MOUTH EVERY DAY AT NIGHT     Authorizing Provider: Lili Lorenz     Ordering User: Elsy Trivedi       Last office visit 2/28/19    Per Dr. Olinda Mccurdy   Date Time Provider Namita Ceballos   8/28/2019  9:40 AM Bernice Lua  [address]

## 2019-08-28 ENCOUNTER — OFFICE VISIT (OUTPATIENT)
Dept: CARDIOLOGY CLINIC | Age: 69
End: 2019-08-28

## 2019-08-28 VITALS
WEIGHT: 315 LBS | RESPIRATION RATE: 14 BRPM | OXYGEN SATURATION: 97 % | HEIGHT: 73 IN | DIASTOLIC BLOOD PRESSURE: 90 MMHG | HEART RATE: 80 BPM | SYSTOLIC BLOOD PRESSURE: 148 MMHG | BODY MASS INDEX: 41.75 KG/M2

## 2019-08-28 DIAGNOSIS — E66.01 OBESITY, MORBID (HCC): ICD-10-CM

## 2019-08-28 DIAGNOSIS — R60.0 BILATERAL EDEMA OF LOWER EXTREMITY: ICD-10-CM

## 2019-08-28 DIAGNOSIS — I42.9 CARDIOMYOPATHY, UNSPECIFIED TYPE (HCC): Primary | ICD-10-CM

## 2019-08-28 DIAGNOSIS — I10 BENIGN ESSENTIAL HTN: ICD-10-CM

## 2019-08-28 DIAGNOSIS — I49.3 PVC (PREMATURE VENTRICULAR CONTRACTION): ICD-10-CM
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
## 2019-08-29 ENCOUNTER — TELEPHONE (OUTPATIENT)
Dept: CARDIOLOGY CLINIC | Age: 69
End: 2019-08-29

## 2019-08-29 NOTE — TELEPHONE ENCOUNTER
Returned call to patient. Two patient indentifiers verified. Pt wanted to know if he still needs to take furosemide because he uses the bathroom a lot with out it. Explained to patient importance of taking furosemide to help remove fluid. Pt stated that he does have swelling. Pt stated that he will continue to take medications. Pt verbalized understanding and denies any further questions at this time.

## 2019-08-29 NOTE — TELEPHONE ENCOUNTER
Pt calling to see if he need to continue to take his Furosemide every day. He can be reached at [phone].     [employer]

## 2019-09-17 RX ORDER — DILTIAZEM HYDROCHLORIDE 240 MG/1
CAPSULE, COATED, EXTENDED RELEASE ORAL
Qty: 90 CAP | Refills: 1 | Status: SHIPPED | OUTPATIENT
Start: 2019-09-17 | End: 2020-03-16

## 2019-09-17 NOTE — TELEPHONE ENCOUNTER
Requested Prescriptions     Signed Prescriptions Disp Refills    dilTIAZem CD (CARDIZEM CD) 240 mg ER capsule 90 Cap 1     Sig: TAKE 1 CAPSULE BY MOUTH EVERY DAY AT NIGHT     Authorizing Provider: Beverly Roldan     Ordering User: Mike Oakley       Last office visit 8/28/19    Per Dr. Broderick Andersen   Date Time Provider Namita Ceballos   2/26/2020 10:00 AM Srinivasa Doty  [address]

## 2019-10-10 RX ORDER — CARVEDILOL 25 MG/1
TABLET ORAL
Qty: 180 TAB | Refills: 3 | Status: SHIPPED | OUTPATIENT
Start: 2019-10-10 | End: 2020-09-23

## 2019-10-10 NOTE — TELEPHONE ENCOUNTER
Requested Prescriptions     Signed Prescriptions Disp Refills    carvedilol (COREG) 25 mg tablet 180 Tab 3     Sig: TAKE 1 TABLET BY MOUTH TWICE A DAY     Authorizing Provider: Tuan Huang     Ordering User: Jed Álvarez       Last office visit 8/28/19    Per Dr. Tanya Layton   Date Time Provider Namita Ceballos   2/26/2020 10:00 AM Kamlesh Arnett  [address]

## 2019-10-30 ENCOUNTER — OFFICE VISIT (OUTPATIENT)
Dept: CARDIOLOGY CLINIC | Age: 69
End: 2019-10-30

## 2019-10-30 VITALS
WEIGHT: 315 LBS | OXYGEN SATURATION: 97 % | HEART RATE: 81 BPM | BODY MASS INDEX: 41.75 KG/M2 | SYSTOLIC BLOOD PRESSURE: 140 MMHG | HEIGHT: 73 IN | DIASTOLIC BLOOD PRESSURE: 98 MMHG | RESPIRATION RATE: 18 BRPM

## 2019-10-30 DIAGNOSIS — E66.01 OBESITY, MORBID (HCC): ICD-10-CM

## 2019-10-30 DIAGNOSIS — I42.9 CARDIOMYOPATHY, UNSPECIFIED TYPE (HCC): Primary | ICD-10-CM

## 2019-10-30 DIAGNOSIS — I89.0 CHRONIC ACQUIRED LYMPHEDEMA: ICD-10-CM

## 2019-10-30 DIAGNOSIS — I10 BENIGN ESSENTIAL HTN: ICD-10-CM

## 2019-10-30 RX ORDER — OLMESARTAN MEDOXOMIL 20 MG/1
TABLET ORAL
Refills: 0 | COMMUNITY
Start: 2019-10-25

## 2019-10-30 NOTE — PROGRESS NOTES
[address]: [place]  [phone]    History of Present Illness:   Mr. Shi Damon is a 79 yo M followed in the past by Dr. Seda Haider with a history of what sounds to be combined diastolic and systolic CHF with an EF of 45-50% in 2013 (in 2009 of 35-40% felt to be due to hypertension), essential hypertension, chronic lower extremity edema. 3/2017 echo was normal with an EF 60%, stress test no ischemia. LLE DVT 6/2017 had lysis and started on coumadin. Since his last visit, he has been doing okay, but his blood pressure has been elevated when he sees his primary care physician and there was concern. He says his BP at the office and when he initially checks it at home is elevated, but after he sits for a few minutes, it comes down to 436 mmHg systolic. This is consistent with white coat hypertension. His blood pressure today was 140/98. He did miss his blood pressure medication for one day and says he felt less short of breath. When he saw his primary care physician, they tried switching him from Losartan to Olmesartan and he does think that this helped his breathing. However, the cost of Olmesartan was high, and he does not know if he will be able to continue this. He has been seeing a lymphedema specialist for the last two to three months three days a week and says that is helping. He denies any chest pain. He has some baseline shortness of breath, which he attributes to his weight that is overall unchanged. He is compensated on exam with clear lungs. He does have chronic 2-3+ bilateral lower extremity edema. His weight is slightly decreased compared to last visit. Assessment and Plan:    1. Cardiomyopathy. This resolved and his ejection fraction was normal in 2017, as well as stress test.    2. Essential hypertension. He is on multiple medications for blood pressure and he does have white coat hypertension.   However, his resting blood pressure has been normal in the 938X systolic and tried to reassure the patient. He does think that symptomatically he felt better on Olmesartan, but this might be cost prohibitive. If that ends up being the case, recommend switching him back to Losartan. 3. Obesity. 4. Second hand smoke. 5. Left lower extremity DVT, status post thrombolysis. 6. PVCs. 7. Sleep apnea. He  has a past medical history of DVT (deep venous thrombosis) ([facility] [address] (06/2017), Goiter, Hypertension, and [facility] All other systems negative except as above. PE  Vitals:    10/30/19 1003   BP: (!) 140/98   Pulse: 81   Resp: 18   SpO2: 97%   Weight: 318 lb (144.2 kg)   Height: 6' 1\" (1.854 m)    Body mass index is 41.96 kg/m².    General appearance - alert, well appearing, and in no distress  Mental status - affect appropriate to mood  Eyes - sclera anicteric, moist mucous membranes  Neck - supple, no JVD  Chest - clear to auscultation, no wheezes, rales or rhonchi  Heart - normal rate, regular rhythm, normal S1, S2, I-II/VI systolic murmur LUSB  Abdomen - soft, obese, nondistended, no masses or organomegaly  Neurological -no focal deficit  Extremities - peripheral pulses normal, 2+ LE pedal edema      Recent Labs:  Lab Results   Component Value Date/Time    Cholesterol, total 199 07/11/2009 04:45 AM    HDL Cholesterol 53 07/11/2009 04:45 AM    LDL, calculated 131.6 (H) 07/11/2009 04:45 AM    Triglyceride 72 07/11/2009 04:45 AM    CHOL/HDL Ratio 3.8 07/11/2009 04:45 AM     Lab Results   Component Value Date/Time    Creatinine (POC) 1.2 07/06/2009 10:30 PM    Creatinine 0.90 04/17/2018 12:00 AM     Lab Results   Component Value Date/Time    BUN 11 04/17/2018 12:00 AM    BUN (POC) 12 07/06/2009 10:30 PM     Lab Results   Component Value Date/Time    Potassium 4.1 04/17/2018 12:00 AM     Lab Results   Component Value Date/Time    Hemoglobin A1c 6.2 (H) 07/11/2009 04:45 AM     Lab Results   Component Value Date/Time    Hemoglobin (POC) 15.3 07/06/2009 10:30 PM    HGB 11.0 (L) 07/13/2017 11:57 AM     Lab Results   Component Value Date/Time    PLATELET 388 70/40/9618 11:57 AM       Reviewed:  Past Medical History:   Diagnosis Date    DVT (deep venous thrombosis) ([facility]) 06/2017    left lower leg    Goiter     Hypertension     Prostate cancer ([address]      Social History     Tobacco Use   Smoking Status Never Smoker   Smokeless Tobacco Never Used     Social History     Substance and Sexual Activity   Alcohol Use No    Comment: in the past     Allergies   Allergen Reactions    Lisinopril Other (comments)     Altered kidney function    Pcn [Penicillins] Rash       Current Outpatient Medications   Medication Sig    olmesartan (BENICAR) 20 mg tablet TAKE 1 TABLET BY MOUTH EVERY DAY    carvedilol (COREG) 25 mg tablet TAKE 1 TABLET BY MOUTH TWICE A DAY    dilTIAZem CD (CARDIZEM CD) 240 mg ER capsule TAKE 1 CAPSULE BY MOUTH EVERY DAY AT NIGHT    furosemide (LASIX) 20 mg tablet TAKE 2 TABLETS BY MOUTH EVERY DAY    terbinafine HCl (LAMISIL) 250 mg tablet TAKE 1 TABLET BY MOUTH EVERY DAY AS DIRECTED    warfarin (COUMADIN) 5 mg tablet Take 1 Tab by mouth as directed.  fluticasone (FLONASE) 50 mcg/actuation nasal spray 2 Sprays by Both Nostrils route daily.  losartan (COZAAR) 100 mg tablet TAKE 1 TABLET BY MOUTH EVERY DAY     No current facility-administered medications for this visit.         Bryce Lee MD  [facility] heart and Vascular [place]  [address] [facility] [address]  [address]

## 2019-10-30 NOTE — LETTER
10/30/2019 11:22 PM 
 
Patient:  Brenda Pearson. YOB: 1950 Date of Visit: 10/30/2019 Dear Manpreet Angeles MD 
[facility] 7 [address] VIA Facsimile: [phone] 
 : 
 
Mr. Burman Curling is a 79 yo M followed in the past by Dr. Vonda Lefort with a history of what sounds to be combined diastolic and systolic CHF with an EF of 45-50% in 2013 (in 2009 of 35-40% felt to be due to hypertension), essential hypertension, chronic lower extremity edema. 3/2017 echo was normal with an EF 60%, stress test no ischemia. LLE DVT 6/2017 had lysis and started on coumadin. Since his last visit, he has been doing okay, but his blood pressure has been elevated when he sees his primary care physician and there was concern. He says his BP at the office and when he initially checks it at home is elevated, but after he sits for a few minutes, it comes down to 365 mmHg systolic. This is consistent with white coat hypertension. His blood pressure today was 140/98. He did miss his blood pressure medication for one day and says he felt less short of breath. When he saw his primary care physician, they tried switching him from Losartan to Olmesartan and he does think that this helped his breathing. However, the cost of Olmesartan was high, and he does not know if he will be able to continue this. He has been seeing a lymphedema specialist for the last two to three months three days a week and says that is helping. He denies any chest pain. He has some baseline shortness of breath, which he attributes to his weight that is overall unchanged. He is compensated on exam with clear lungs. He does have chronic 2-3+ bilateral lower extremity edema. His weight is slightly decreased compared to last visit. Assessment and Plan: 1. Cardiomyopathy.   This resolved and his ejection fraction was normal in 2017, as well as stress test.   
 2. Essential hypertension. He is on multiple medications for blood pressure and he does have white coat hypertension. However, his resting blood pressure has been normal in the 359I systolic and tried to reassure the patient. He does think that symptomatically he felt better on Olmesartan, but this might be cost prohibitive. If that ends up being the case, recommend switching him back to Losartan. 3. Obesity. 4. Second hand smoke. 5. Left lower extremity DVT, status post thrombolysis. 6. PVCs. 7. Sleep apnea. If you have questions, please do not hesitate to call me. Sincerely, Haven Garcia MD

## 2019-11-20 ENCOUNTER — TELEPHONE (OUTPATIENT)
Dept: CARDIOLOGY CLINIC | Age: 69
End: 2019-11-20

## 2019-11-20 NOTE — TELEPHONE ENCOUNTER
Patient states he's had a couple of appointments this week and in each appt his blood pressure has been unusually high. He would like to discuss further to determine if he needs to be seen or if he needs medication changes. Please advise.      Phone: [phone]

## 2019-11-20 NOTE — TELEPHONE ENCOUNTER
Returned call to patient. Two patient indentifiers verified. Patient stated that BP was elevated at doctor office this morning when he went in for a walking test. He was unable to have test due to high bp. Pt stated BP was 140/ 82 at home. Pt was informed to check BP for one week and call back with reading. Pt verbalized understanding and denies any further questions at this time.

## 2019-12-02 ENCOUNTER — TELEPHONE (OUTPATIENT)
Dept: CARDIOLOGY CLINIC | Age: 69
End: 2019-12-02

## 2019-12-02 NOTE — TELEPHONE ENCOUNTER
Pt would like to speak with you in regards to a medication the was prescribed to him by his PCP and would like to know if it is okay to be taken with his other medications that Dr. Fortunato Beth has prescribed him. Please advise.     Phone: [phone]

## 2019-12-02 NOTE — TELEPHONE ENCOUNTER
Returned call to patient. Two patient indentifiers verified. Pt stated that he was switched from valsartan to candesartan. Pt was informed that should be fine if the primary care switched the medication. Pt verbalized understanding and denies any further questions at this time.

## 2019-12-18 ENCOUNTER — TELEPHONE (OUTPATIENT)
Dept: CARDIOLOGY CLINIC | Age: 69
End: 2019-12-18

## 2019-12-18 NOTE — TELEPHONE ENCOUNTER
Patient stated that he needs dental work done and his dentist is requesting clearance. Please advise.     Phone #: [phone]    Dr. Robin Hodgson  Phone #: [phone]  Fax #: [phone]    Thanks

## 2019-12-18 NOTE — TELEPHONE ENCOUNTER
Clearance letter was faxed over and patient was called and informed that letter was sent. Pt verbalized understanding and denies any further questions at this time.

## 2019-12-18 NOTE — TELEPHONE ENCOUNTER
MD Inocencia Cabrera, RN   Caller: Unspecified (Today,  8:11 AM)             He is low risk and can proceed. If you can send a letter that'd be great.  thx

## 2019-12-18 NOTE — LETTER
12/18/2019 9:02 AM 
 
Mr. Kaykay Cornell [id number] This patient is at low risk from a cardiac standpoint for upcoming procedure. If you have any questions please contact our office at [phone]. Sincerely, Maryam Rodriguez MD

## 2020-01-13 ENCOUNTER — TELEPHONE (OUTPATIENT)
Dept: CARDIOLOGY CLINIC | Age: 70
End: 2020-01-13

## 2020-01-13 NOTE — TELEPHONE ENCOUNTER
Patient is inquiring  about the results form his echo today, 1/13. Patient was informed that there was no final result.      Phone: [phone]

## 2020-01-14 NOTE — TELEPHONE ENCOUNTER
Returned call to patient. Two patient indentifiers verified. Pt was informed that Echo was completed yesterday and that I will call him once I have the results. Pt verbalized understanding and denies any further questions at this time.

## 2020-01-14 NOTE — TELEPHONE ENCOUNTER
See results note.      Future Appointments   Date Time Provider Namita Lin   4/29/2020  9:40 AM Yudi Grayson  [address]   1/18/2021  2:00 PM ECHO, [address]   1/18/2021  4:00 PM Yudi Grayson  [address]

## 2020-02-21 DIAGNOSIS — R60.0 BILATERAL EDEMA OF LOWER EXTREMITY: ICD-10-CM

## 2020-02-21 DIAGNOSIS — I89.0 CHRONIC ACQUIRED LYMPHEDEMA: ICD-10-CM

## 2020-02-21 DIAGNOSIS — I27.20 PULMONARY HYPERTENSION (HCC): Primary | ICD-10-CM

## 2020-02-21 DIAGNOSIS — R60.0 EDEMA OF LEFT LOWER EXTREMITY: ICD-10-CM

## 2020-02-21 RX ORDER — FUROSEMIDE 20 MG/1
TABLET ORAL
Qty: 60 TAB | Refills: 1 | Status: SHIPPED | OUTPATIENT
Start: 2020-02-21 | End: 2020-04-24

## 2020-02-21 NOTE — TELEPHONE ENCOUNTER
Requested Prescriptions     Signed Prescriptions Disp Refills    furosemide (LASIX) 20 mg tablet 60 Tab 1     Sig: TAKE 2 TABLETS BY MOUTH EVERY DAY OR AS ADVISED BY PHYSICIAN     Authorizing Provider: Chasity Doe     Ordering User: Frank Sandhoff    per Dr. Livingston Needs verbal order    Future Appointments   Date Time Provider Namita Ceballos   4/29/2020  9:40 AM Kavitha Xie  [address]   1/18/2021  2:00 PM ECHO, [address]   1/18/2021  4:00 PM Kavitha Xie  [address]

## 2020-03-16 RX ORDER — DILTIAZEM HYDROCHLORIDE 240 MG/1
CAPSULE, COATED, EXTENDED RELEASE ORAL
Qty: 90 CAP | Refills: 1 | Status: SHIPPED | OUTPATIENT
Start: 2020-03-16 | End: 2020-09-08

## 2020-03-16 NOTE — TELEPHONE ENCOUNTER
Requested Prescriptions     Signed Prescriptions Disp Refills    dilTIAZem CD (CARDIZEM CD) 240 mg ER capsule 90 Cap 1     Sig: TAKE 1 CAPSULE BY MOUTH EVERY DAY AT NIGHT     Authorizing Provider: Saw Finn     Ordering User: Karla Rodriguez       Last office visit 10/30/19    Per Dr. Noemi Oates   Date Time Provider Namita Lindi   4/29/2020  9:40 AM Paulie Toussaint  [address]   1/18/2021  2:00 PM ECHO, [address]   1/18/2021  4:00 PM Paulie Toussaint  [address]

## 2020-03-18 ENCOUNTER — TELEPHONE (OUTPATIENT)
Dept: CARDIOLOGY CLINIC | Age: 70
End: 2020-03-18

## 2020-03-18 NOTE — TELEPHONE ENCOUNTER
Returned call to patient. Two patient indentifiers verified. Pt questions were answer. Pt given number to call if has any additional questions [phone]. Pt verbalized understanding and denies any further questions at this time.      Future Appointments   Date Time Provider Namita Ceballos   4/29/2020  9:40 AM Windy Luciano  [address] 1/18/2021  2:00 PM [facility] [facility]   1/18/2021  4:00 PM Windy Luciano  [address]

## 2020-03-18 NOTE — TELEPHONE ENCOUNTER
Patient would like to discuss with you his concerns of COVID-19 being that he has pulmonary hypertension. Please advise.     Phone #: [phone]  Thanks

## 2020-04-20 ENCOUNTER — TELEPHONE (OUTPATIENT)
Dept: CARDIOLOGY CLINIC | Age: 70
End: 2020-04-20

## 2020-04-20 NOTE — TELEPHONE ENCOUNTER
Left a message for patient to call back and switch appointment to a virtual visit or telephone visit. The appointment can stay at same time and date.

## 2020-04-20 NOTE — TELEPHONE ENCOUNTER
Future Appointments   Date Time Provider Namita Lin   4/29/2020  9:40 AM Grace Robertson  [address]   1/18/2021  2:00 PM ECHO, [address]   1/18/2021  4:00 PM Grace Robertson  [address]

## 2020-04-24 DIAGNOSIS — I27.20 PULMONARY HYPERTENSION (HCC): ICD-10-CM

## 2020-04-24 DIAGNOSIS — I89.0 CHRONIC ACQUIRED LYMPHEDEMA: ICD-10-CM

## 2020-04-24 DIAGNOSIS — R60.0 EDEMA OF LEFT LOWER EXTREMITY: ICD-10-CM

## 2020-04-24 DIAGNOSIS — R60.0 BILATERAL EDEMA OF LOWER EXTREMITY: ICD-10-CM

## 2020-04-24 RX ORDER — FUROSEMIDE 20 MG/1
TABLET ORAL
Qty: 60 TAB | Refills: 1 | Status: SHIPPED | OUTPATIENT
Start: 2020-04-24 | End: 2020-05-18

## 2020-04-24 NOTE — TELEPHONE ENCOUNTER
Requested Prescriptions     Signed Prescriptions Disp Refills    furosemide (LASIX) 20 mg tablet 60 Tab 1     Sig: TAKE 2 TABLETS BY MOUTH EVERY DAY OR AS ADVISED BY PHYSICIAN     Authorizing Provider: Red Dollar     Ordering User: Aditi Painter       Last office visit 10/30/19    Per Dr. Josiah Boyce   Date Time Provider Namita Ceballos   4/29/2020  9:40 AM Sweetie Padilla  [address]   1[facility]18[facility]2021  [facility]   1/18/2021  4:00 PM Sweetie Padilla  [address]

## 2020-04-28 NOTE — PROGRESS NOTES
TELEPHONE VISIT DOCUMENTATION     Pursuant to the emergency declaration under the [address], [facility] waiver authority and the [employer] Act, this Telephone Visit was conducted, with patient's consent, to reduce the patient's risk of exposure to COVID-19 and provide continuity of care for an established patient. He and/or health care decision maker is aware that that he may receive a bill for this telephone service, depending on his insurance coverage, and has provided verbal consent to proceed. This is a Patient Initiated Episode with an Established Patient who has not had a related appointment within my department in the past 7 days or scheduled within the next 24 hours. HISTORY OF PRESENTING ILLNESS      Jone Dale is a 77 y.o. male evaluated via telephone on 4/29/2020 due to COVID 19 restrictions. Patient unable to participate in Virtual Visit with synchronous audio/visual technology. Mr. Kasey Jenkins is a 76 yo M followed in the past by Dr. Otf Calvillo with a history of what sounds to be combined diastolic and systolic CHF with an EF of 45-50% in 2013 (in 2009 of 35-40% felt to be due to hypertension), essential hypertension, chronic lower extremity edema. 3/2017 echo was normal with an EF 60%, stress test no ischemia. LLE DVT 6/2017 had lysis and started on coumadin. Since his last visit, overall he has been doing okay. He denies any exertional chest pain. He has some baseline shortness of breath, but this is unchanged. No palpitations, lightheadedness or dizziness. He has been compliant with his medications. He checks his blood pressure one or two times a week and this has been normal for him. He does have a lot of anxiety about the COVID virus. He did a virtual visit with his primary care physician earlier this week.     Echo 1/13/20 Normal cavity size and systolic function (ejection fraction normal), mild LVH, estimated EF of 55%. No regional wall motion abnormality noted. Assessment and Plan:    1. Cardiomyopathy. Resolved and his ejection fraction was normal in 2017 and repeat echocardiogram in 01/2020 demonstrated preserved LV function with EF of 55%. He did have mild to moderate mitral regurgitation. 2. Mild to moderate mitral regurgitation. Consider repeat echocardiogram in one to two years. Will tentatively have him follow back in six months. 3. Essential hypertension. Blood pressure is controlled. History of white coat hypertension. 4. Obesity. 5. Second hand smoke. 6. Left lower extremity DVT, status post thrombolysis. 7. PVCs. 8. Sleep apnea. ASSESSMENT/PLAN:      We discussed the expected course, resolution and complications of the diagnosis(es) in detail. Medication risks, benefits, costs, interactions, and alternatives were discussed as indicated. I advised him to contact the office if his condition worsens, changes or fails to improve as anticipated.  He expressed understanding with the diagnosis(es) and plan       ACTIVE PROBLEM LIST     Patient Active Problem List    Diagnosis Date Noted    Obesity, morbid ([address] 02/27/2018    Multinodular goiter (nontoxic) 09/15/2017    Deep vein thrombosis (DVT) of left lower extremity (HCC) 08/17/2017    Cardiomyopathy ([address] 03/06/2017    Shortness of breath 03/06/2017    Bilateral edema of lower extremity 03/06/2017    Benign essential HTN 03/06/2017           PAST MEDICAL HISTORY     Past Medical History:   Diagnosis Date    DVT (deep venous thrombosis) ([address] 06/2017    left lower leg    Goiter     Hypertension     Prostate cancer ([address]            PAST SURGICAL HISTORY     Past Surgical History:   Procedure Laterality Date    HX KNEE ARTHROSCOPY      Rt knee    HX PROSTATECTOMY            ALLERGIES     Allergies   Allergen Reactions    Lisinopril Other (comments)     Altered kidney function    Pcn [Penicillins] Rash          FAMILY HISTORY     Family History   Problem Relation Age of Onset    Arthritis-osteo Mother     Heart Failure Father     Diabetes Brother     Thyroid Disease Brother     Other Maternal Grandmother         MS    No Known Problems Maternal Grandfather     No Known Problems Paternal Grandmother     No Known Problems Paternal Grandfather     Cancer Neg Hx     negative for cardiac disease       SOCIAL HISTORY     Social History     Socioeconomic History    Marital status:      Spouse name: Not on file    Number of children: Not on file    Years of education: Not on file    Highest education level: Not on file   Tobacco Use    Smoking status: Never Smoker    Smokeless tobacco: Never Used   Substance and Sexual Activity    Alcohol use: No     Comment: in the past    Drug use: No     Comment: in the past         MEDICATIONS     Current Outpatient Medications   Medication Sig    candesartan (ATACAND) 16 mg tablet Take 16 mg by mouth daily.  furosemide (LASIX) 20 mg tablet TAKE 2 TABLETS BY MOUTH EVERY DAY OR AS ADVISED BY PHYSICIAN    dilTIAZem CD (CARDIZEM CD) 240 mg ER capsule TAKE 1 CAPSULE BY MOUTH EVERY DAY AT NIGHT    carvedilol (COREG) 25 mg tablet TAKE 1 TABLET BY MOUTH TWICE A DAY    warfarin (COUMADIN) 5 mg tablet Take 1 Tab by mouth as directed.  fluticasone (FLONASE) 50 mcg/actuation nasal spray 2 Sprays by Both Nostrils route daily.  olmesartan (BENICAR) 20 mg tablet TAKE 1 TABLET BY MOUTH EVERY DAY    losartan (COZAAR) 100 mg tablet TAKE 1 TABLET BY MOUTH EVERY DAY    terbinafine HCl (LAMISIL) 250 mg tablet TAKE 1 TABLET BY MOUTH EVERY DAY AS DIRECTED     No current facility-administered medications for this visit. I have reviewed the nurses notes, vitals, problem list, allergy list, medical history, family, social history and medications.        REVIEW OF SYMPTOMS     Constitutional: Negative for fever, chills, malaise/fatigue and diaphoresis. Respiratory: Negative for cough, hemoptysis, sputum production, shortness of breath and wheezing. Cardiovascular: Negative for chest pain, palpitations, orthopnea, claudication, leg swelling and PND. Gastrointestinal: Negative for heartburn, nausea, vomiting, blood in stool and melena. Genitourinary: Negative for dysuria and flank pain. Musculoskeletal: Negative for joint pain and back pain. Skin: Negative for rash. Neurological: Negative for focal weakness, seizures, loss of consciousness, weakness and headaches. Endo/Heme/Allergies: Negative for abnormal bleeding. Psychiatric/Behavioral: Negative for memory loss. DIAGNOSTIC DATA      No specialty comments available. LABORATORY DATA      Lab Results   Component Value Date/Time    WBC 6.4 07/13/2017 11:57 AM    Hemoglobin (POC) 15.3 07/06/2009 10:30 PM    HGB 11.0 (L) 07/13/2017 11:57 AM    Hematocrit (POC) 45 07/06/2009 10:30 PM    HCT 35.5 (L) 07/13/2017 11:57 AM    PLATELET 263 70/80/1789 11:57 AM    MCV 86 07/13/2017 11:57 AM      Lab Results   Component Value Date/Time    Sodium 142 04/17/2018 12:00 AM    Potassium 4.1 04/17/2018 12:00 AM    Chloride 101 04/17/2018 12:00 AM    CO2 26 04/17/2018 12:00 AM    Anion gap 6 07/27/2009 04:45 AM    Glucose 125 (H) 04/17/2018 12:00 AM    BUN 11 04/17/2018 12:00 AM    Creatinine 0.90 04/17/2018 12:00 AM    BUN/Creatinine ratio 12 04/17/2018 12:00 AM    GFR est  04/17/2018 12:00 AM    GFR est non-AA 89 04/17/2018 12:00 AM    Calcium 9.2 04/17/2018 12:00 AM    Bilirubin, total 0.7 07/27/2009 04:45 AM    AST (SGOT) 19 07/27/2009 04:45 AM    Alk.  phosphatase 81 07/27/2009 04:45 AM    Protein, total 7.4 07/27/2009 04:45 AM    Albumin 3.8 07/27/2009 04:45 AM    Globulin 3.6 07/27/2009 04:45 AM    A-G Ratio 1.1 07/27/2009 04:45 AM    ALT (SGPT) 33 07/27/2009 04:45 AM            Total Time: 15 minutes      MD Terrell Elise. MAMADOU LAWRENCE  [address] [facility], [address]  [facility] [place]  [phone] / [phone] Fax

## 2020-04-29 ENCOUNTER — VIRTUAL VISIT (OUTPATIENT)
Dept: CARDIOLOGY CLINIC | Age: 70
End: 2020-04-29

## 2020-04-29 VITALS — SYSTOLIC BLOOD PRESSURE: 150 MMHG | DIASTOLIC BLOOD PRESSURE: 80 MMHG

## 2020-04-29 DIAGNOSIS — I42.9 CARDIOMYOPATHY, UNSPECIFIED TYPE (HCC): Primary | ICD-10-CM

## 2020-04-29 DIAGNOSIS — E66.01 OBESITY, MORBID (HCC): ICD-10-CM

## 2020-04-29 DIAGNOSIS — I10 BENIGN ESSENTIAL HTN: ICD-10-CM

## 2020-04-29 DIAGNOSIS — I89.0 CHRONIC ACQUIRED LYMPHEDEMA: ICD-10-CM

## 2020-04-29 RX ORDER — CANDESARTAN 16 MG/1
16 TABLET ORAL DAILY
COMMUNITY
End: 2020-11-06

## 2020-04-29 NOTE — LETTER
4/30/2020 6:01 PM 
 
Patient:  Sangeetha Sainz YOB: 1950 Date of Visit: 4/29/2020 Dear Ko Ambrose MD 
[facility] [address] [address] VIA Facsimile: [phone]: 
 
 
Mr. Tom Cox is a 76 yo M followed in the past by Dr. Brunilda Ballard with a history of what sounds to be combined diastolic and systolic CHF with an EF of 45-50% in 2013 (in 2009 of 35-40% felt to be due to hypertension), essential hypertension, chronic lower extremity edema. 3/2017 echo was normal with an EF 60%, stress test no ischemia. LLE DVT 6/2017 had lysis and started on coumadin. Since his last visit, overall he has been doing okay. He denies any exertional chest pain. He has some baseline shortness of breath, but this is unchanged. No palpitations, lightheadedness or dizziness. He has been compliant with his medications. He checks his blood pressure one or two times a week and this has been normal for him. He does have a lot of anxiety about the COVID virus. He did a virtual visit with his primary care physician earlier this week. Echo 1/13/20 Normal cavity size and systolic function (ejection fraction normal), mild LVH, estimated EF of 55%. No regional wall motion abnormality noted. Assessment and Plan: 1. Cardiomyopathy. Resolved and his ejection fraction was normal in 2017 and repeat echocardiogram in 01/2020 demonstrated preserved LV function with EF of 55%. He did have mild to moderate mitral regurgitation. 2. Mild to moderate mitral regurgitation. Consider repeat echocardiogram in one to two years. Will tentatively have him follow back in six months. 3. Essential hypertension. Blood pressure is controlled. History of white coat hypertension. 4. Obesity. 5. Second hand smoke. 6. Left lower extremity DVT, status post thrombolysis. 7. PVCs. 8. Sleep apnea. If you have questions, please do not hesitate to call me. Sincerely, Chiara Lowery MD

## 2020-05-16 DIAGNOSIS — R60.0 EDEMA OF LEFT LOWER EXTREMITY: ICD-10-CM

## 2020-05-16 DIAGNOSIS — I89.0 CHRONIC ACQUIRED LYMPHEDEMA: ICD-10-CM

## 2020-05-16 DIAGNOSIS — I27.20 PULMONARY HYPERTENSION (HCC): ICD-10-CM

## 2020-05-16 DIAGNOSIS — R60.0 BILATERAL EDEMA OF LOWER EXTREMITY: ICD-10-CM

## 2020-05-18 RX ORDER — FUROSEMIDE 20 MG/1
TABLET ORAL
Qty: 60 TAB | Refills: 1 | Status: SHIPPED | OUTPATIENT
Start: 2020-05-18 | End: 2020-07-13

## 2020-05-18 NOTE — TELEPHONE ENCOUNTER
Requested Prescriptions     Signed Prescriptions Disp Refills    furosemide (LASIX) 20 mg tablet 60 Tab 1     Sig: TAKE 2 TABLETS BY MOUTH EVERY DAY OR AS ADVISED BY PHYSICIAN     Authorizing Provider: Jayla Couch     Ordering User: Enrique Levy       Last office visit 4/29/20    Per Dr. Jovanny Lua   Date Time Provider Namita Ceballos   10/28/2020  1:00 PM Bessie Barnes  [address]   1/18/2021  2:00 PM ECHO, [address] [facility]   1/18/2021  4:00 PM Bessie Barnes  [address]

## 2020-07-12 DIAGNOSIS — I27.20 PULMONARY HYPERTENSION (HCC): ICD-10-CM

## 2020-07-12 DIAGNOSIS — R60.0 BILATERAL EDEMA OF LOWER EXTREMITY: ICD-10-CM

## 2020-07-12 DIAGNOSIS — R60.0 EDEMA OF LEFT LOWER EXTREMITY: ICD-10-CM

## 2020-07-12 DIAGNOSIS — I89.0 CHRONIC ACQUIRED LYMPHEDEMA: ICD-10-CM

## 2020-07-13 RX ORDER — FUROSEMIDE 20 MG/1
TABLET ORAL
Qty: 60 TAB | Refills: 5 | Status: SHIPPED | OUTPATIENT
Start: 2020-07-13 | End: 2020-11-05

## 2020-07-13 NOTE — TELEPHONE ENCOUNTER
Requested Prescriptions     Signed Prescriptions Disp Refills    furosemide (LASIX) 20 mg tablet 60 Tab 5     Sig: TAKE 2 TABLETS BY MOUTH EVERY DAY OR AS ADVISED BY PHYSICIAN     Authorizing Provider: Sara Duran     Ordering User: Timo Marsh       Last office visit 4/29/2020    Per Dr. Pamela Crockett   Date Time Provider Namita Ceballos   10/28/2020  1:00 PM Mary Contreras  [address] 1/18/2021  2:00 [facility]   1/18/2021  4:00 PM Mary Contreras  [address]

## 2020-09-02 DIAGNOSIS — I82.409 DEEP VENOUS EMBOLISM AND THROMBOSIS (HCC): ICD-10-CM

## 2020-09-02 DIAGNOSIS — N52.9 IMPOTENCE: ICD-10-CM

## 2020-09-02 DIAGNOSIS — R32 URINARY INCONTINENCE, UNSPECIFIED TYPE: ICD-10-CM

## 2020-09-02 DIAGNOSIS — C61 MALIGNANT NEOPLASM OF PROSTATE (HCC): ICD-10-CM

## 2020-09-08 RX ORDER — DILTIAZEM HYDROCHLORIDE 240 MG/1
CAPSULE, COATED, EXTENDED RELEASE ORAL
Qty: 90 CAP | Refills: 0 | Status: SHIPPED | OUTPATIENT
Start: 2020-09-08 | End: 2020-12-14

## 2020-09-23 RX ORDER — CARVEDILOL 25 MG/1
TABLET ORAL
Qty: 180 TAB | Refills: 3 | Status: SHIPPED | OUTPATIENT
Start: 2020-09-23 | End: 2021-09-08

## 2020-09-23 NOTE — TELEPHONE ENCOUNTER
Requested Prescriptions     Signed Prescriptions Disp Refills    carvediloL (COREG) 25 mg tablet 180 Tab 3     Sig: TAKE 1 TABLET BY MOUTH TWICE A DAY     Authorizing Provider: Brody Monson     Ordering User: Monse Nicky       Last office visit 4/29/2020    Per Dr. Pia Miles   Date Time Provider Namita Ceballos   10/28/2020  1:00 PM MD SANDRO Hernandez BS AMB   1/18/2021  2:00 PM PEDRO ROBISON AMB   1/18/2021  4:00 PM MD SANDRO Hernandez BS AMB

## 2020-10-20 ENCOUNTER — TELEPHONE (OUTPATIENT)
Dept: CARDIOLOGY CLINIC | Age: 70
End: 2020-10-20

## 2020-10-20 NOTE — TELEPHONE ENCOUNTER
Pt inquiring if it's ok to take some gummies(medication)  for stress.  Please advise    Phone:[phone]

## 2020-11-05 DIAGNOSIS — R60.0 BILATERAL EDEMA OF LOWER EXTREMITY: ICD-10-CM

## 2020-11-05 DIAGNOSIS — R60.0 EDEMA OF LEFT LOWER EXTREMITY: ICD-10-CM

## 2020-11-05 DIAGNOSIS — I27.20 PULMONARY HYPERTENSION (HCC): ICD-10-CM

## 2020-11-05 DIAGNOSIS — I89.0 CHRONIC ACQUIRED LYMPHEDEMA: ICD-10-CM

## 2020-11-05 RX ORDER — FUROSEMIDE 20 MG/1
TABLET ORAL
Qty: 60 TAB | Refills: 5 | Status: SHIPPED | OUTPATIENT
Start: 2020-11-05 | End: 2022-03-24

## 2020-11-06 ENCOUNTER — VIRTUAL VISIT (OUTPATIENT)
Dept: CARDIOLOGY CLINIC | Age: 70
End: 2020-11-06
Payer: MEDICARE

## 2020-11-06 DIAGNOSIS — I34.0 NONRHEUMATIC MITRAL VALVE REGURGITATION: ICD-10-CM

## 2020-11-06 DIAGNOSIS — I49.3 PVC (PREMATURE VENTRICULAR CONTRACTION): ICD-10-CM

## 2020-11-06 DIAGNOSIS — I10 BENIGN ESSENTIAL HTN: ICD-10-CM

## 2020-11-06 DIAGNOSIS — I42.9 CARDIOMYOPATHY, UNSPECIFIED TYPE (HCC): Primary | ICD-10-CM

## 2020-11-06 PROCEDURE — 99443 PR PHYS/QHP TELEPHONE EVALUATION 21-30 MIN: CPT | Performed by: INTERNAL MEDICINE

## 2020-11-06 NOTE — LETTER
11/6/2020 2:17 PM 
 
Patient:  Ankit Chau. YOB: 1950 Date of Visit: 11/6/2020 Dear William Jones MD 
[facility] [address] [address] VIA Facsimile: [phone]: 
 
 
Mr. Anca Allen is a 78 yo M followed in the past by Dr. Derek Schaefer with a history of what sounds to be combined diastolic and systolic CHF with an EF of 45-50% in 2013 (in 2009 of 35-40% felt to be due to hypertension), essential hypertension, chronic lower extremity edema. 3/2017 echo was normal with an EF 60%, stress test no ischemia. LLE DVT 6/2017 had lysis and started on coumadin. Since his last visit, overall he has been doing okay. He denies any exertional chest pain. He has some baseline shortness of breath, but this is unchanged. No palpitations, lightheadedness or dizziness. He has been compliant with his medications. Echo 1/13/20 Normal cavity size and systolic function (ejection fraction normal), mild LVH, estimated EF of 55%. No regional wall motion abnormality noted. Assessment and Plan: 1. Cardiomyopathy. Stable and compensated. Ejection fraction was normal in 2017 and repeat echocardiogram in 01/2020 demonstrated preserved LV function with EF of 55%. He did have mild to moderate mitral regurgitation. 2. Mild to moderate mitral regurgitation. Consider repeat echocardiogram in one to two years. Will tentatively have him follow back in six months. 3. Essential hypertension. Blood pressure is controlled. History of white coat hypertension. 4. Obesity. 5. Second hand smoke. 6. Left lower extremity DVT, status post thrombolysis. 7. PVCs. 8. Sleep apnea. If you have questions, please do not hesitate to call me. Sincerely, Noah Plaza MD

## 2020-11-06 NOTE — PROGRESS NOTES
TELEPHONE VISIT DOCUMENTATION     Pursuant to the emergency declaration under the [facility] waiver authority and the [employer] Act, this Telephone Visit was conducted, with patient's consent, to reduce the patient's risk of exposure to COVID-19 and provide continuity of care for an established patient. He and/or health care decision maker is aware that that he may receive a bill for this telephone service, depending on his insurance coverage, and has provided verbal consent to proceed. This is a Patient Initiated Episode with an Established Patient who has not had a related appointment within my department in the past 7 days or scheduled within the next 24 hours. HISTORY OF PRESENTING ILLNESS      Teo Bloom is a 77 y.o. male evaluated via telephone on 11/6/2020 due to COVID 19 restrictions. Patient unable to participate in Virtual Visit with synchronous audio/visual technology. Mr. Ekaterina Kasper is a 78 yo M followed in the past by Dr. Lissette Oconnor with a history of what sounds to be combined diastolic and systolic CHF with an EF of 45-50% in 2013 (in 2009 of 35-40% felt to be due to hypertension), essential hypertension, chronic lower extremity edema. 3/2017 echo was normal with an EF 60%, stress test no ischemia. LLE DVT 6/2017 had lysis and started on coumadin. Since his last visit, overall he has been doing okay. He denies any exertional chest pain. He has some baseline shortness of breath, but this is unchanged. No palpitations, lightheadedness or dizziness. He has been compliant with his medications. Echo 1/13/20 Normal cavity size and systolic function (ejection fraction normal), mild LVH, estimated EF of 55%. No regional wall motion abnormality noted. Assessment and Plan:    1. Cardiomyopathy. Stable and compensated.  Ejection fraction was normal in 2017 and repeat echocardiogram in 01/2020 demonstrated preserved LV function with EF of 55%. He did have mild to moderate mitral regurgitation. 2. Mild to moderate mitral regurgitation. Consider repeat echocardiogram in one to two years. Will tentatively have him follow back in six months. 3. Essential hypertension. Blood pressure is controlled. History of white coat hypertension. 4. Obesity. 5. Second hand smoke. 6. Left lower extremity DVT, status post thrombolysis. 7. PVCs. 8. Sleep apnea. ASSESSMENT/PLAN:      We discussed the expected course, resolution and complications of the diagnosis(es) in detail. Medication risks, benefits, costs, interactions, and alternatives were discussed as indicated. I advised him to contact the office if his condition worsens, changes or fails to improve as anticipated. He expressed understanding with the diagnosis(es) and plan       ACTIVE PROBLEM LIST     Patient Active Problem List    Diagnosis Date Noted    Deep venous embolism and thrombosis ([facility] 09/02/2020    Impotence 09/02/2020    Malignant neoplasm of prostate ([address] 09/02/2020    Incontinence 09/02/2020    Obesity, morbid ([address] 02/27/2018    Multinodular goiter (nontoxic) 09/15/2017    Deep vein thrombosis (DVT) of left lower extremity ([address] 08/17/2017    Cardiomyopathy ([address] 03/06/2017    Shortness of breath 03/06/2017    Bilateral edema of lower extremity 03/06/2017    Benign essential HTN 03/06/2017           PAST MEDICAL HISTORY     Past Medical History:   Diagnosis Date    Deep venous embolism and thrombosis ([facility] 9/2/2020    Being seen at [address] [place] [address] for LLE DVT. He is on Coumadin 5 mg.  DVT (deep venous thrombosis) ([address] 06/2017    left lower leg    Goiter     Hypertension     Impotence 9/2/2020    He is s/p prostatectomy on 8/14/14. He has partial tumescence but has trouble maintianing it for more than a few seconds. He had erectile dysfunction pre-operatively. Viagra is helpful to some extent. He has not tried a KAVITHA or Trimix.  Incontinence 9/2/2020    He is s/p prostatectomy on 8/14/14. Mild leakage, not bothersome. He can make it to the bathroom in time. He occasionally uses a safety pad. He can leak a few drops with urge.  Malignant neoplasm of prostate ([facility] 9/2/2020    He is s/p prostatectomy 8/14/14. He had [place]'s 6 disease with negative margins and negative nodes. His PSA has been undetectable.     Prostate cancer ([facility]            PAST SURGICAL HISTORY     Past Surgical History:   Procedure Laterality Date    HX KNEE ARTHROSCOPY      Rt knee    HX PROSTATECTOMY      HX PROSTATECTOMY  08/14/2014          ALLERGIES     Allergies   Allergen Reactions    Lisinopril Other (comments)     Altered kidney function    Pcn [Penicillins] Rash    Penicillins Unknown (comments)          FAMILY HISTORY     Family History   Problem Relation Age of Onset    Arthritis-osteo Mother     Heart Failure Father     Diabetes Brother     Thyroid Disease Brother     Other Maternal Grandmother         MS    No Known Problems Maternal Grandfather     No Known Problems Paternal Grandmother     No Known Problems Paternal Grandfather     Cancer Neg Hx     negative for cardiac disease       SOCIAL HISTORY     Social History     Socioeconomic History    Marital status:      Spouse name: Not on file    Number of children: Not on file    Years of education: Not on file    Highest education level: Not on file   Tobacco Use    Smoking status: Never Smoker    Smokeless tobacco: Never Used   Substance and Sexual Activity    Alcohol use: No     Comment: in the past    Drug use: No     Comment: in the past   Social History Narrative    ** Merged History Encounter **              MEDICATIONS     Current Outpatient Medications   Medication Sig    furosemide (LASIX) 20 mg tablet TAKE 2 TABLETS BY MOUTH EVERY DAY OR AS ADVISED BY PHYSICIAN    carvediloL (COREG) 25 mg tablet TAKE 1 TABLET BY MOUTH TWICE A DAY    dilTIAZem ER (CARDIZEM CD) 240 mg capsule TAKE 1 CAPSULE BY MOUTH EVERY NIGHT    candesartan (ATACAND) 16 mg tablet Take 16 mg by mouth daily.  olmesartan (BENICAR) 20 mg tablet TAKE 1 TABLET BY MOUTH EVERY DAY    losartan (COZAAR) 100 mg tablet TAKE 1 TABLET BY MOUTH EVERY DAY    terbinafine HCl (LAMISIL) 250 mg tablet TAKE 1 TABLET BY MOUTH EVERY DAY AS DIRECTED    warfarin (COUMADIN) 5 mg tablet Take 1 Tab by mouth as directed.  fluticasone (FLONASE) 50 mcg/actuation nasal spray 2 Sprays by Both Nostrils route daily. No current facility-administered medications for this visit. I have reviewed the nurses notes, vitals, problem list, allergy list, medical history, family, social history and medications. REVIEW OF SYMPTOMS     Constitutional: Negative for fever, chills, malaise/fatigue and diaphoresis. Respiratory: Negative for cough, hemoptysis, sputum production, shortness of breath and wheezing. Cardiovascular: Negative for chest pain, palpitations, orthopnea, claudication, leg swelling and PND. Gastrointestinal: Negative for heartburn, nausea, vomiting, blood in stool and melena. Genitourinary: Negative for dysuria and flank pain. Musculoskeletal: Negative for joint pain and back pain. Skin: Negative for rash. Neurological: Negative for focal weakness, seizures, loss of consciousness, weakness and headaches. Endo/Heme/Allergies: Negative for abnormal bleeding. Psychiatric/Behavioral: Negative for memory loss. DIAGNOSTIC DATA      No specialty comments available.        LABORATORY DATA      Lab Results   Component Value Date/Time    WBC 6.4 07/13/2017 11:57 AM    Hemoglobin (POC) 15.3 07/06/2009 10:30 PM    HGB 11.0 (L) 07/13/2017 11:57 AM    Hematocrit (POC) 45 07/06/2009 10:30 PM    HCT 35.5 (L) 07/13/2017 11:57 AM    PLATELET 424 41/54/0100 11:57 AM    MCV 86 07/13/2017 11:57 AM      Lab Results   Component Value Date/Time    Sodium 142 04/17/2018 12:00 AM    Potassium 4.1 04/17/2018 12:00 AM    Chloride 101 04/17/2018 12:00 AM    CO2 26 04/17/2018 12:00 AM    Anion gap 6 07/27/2009 04:45 AM    Glucose 125 (H) 04/17/2018 12:00 AM    BUN 11 04/17/2018 12:00 AM    Creatinine 0.90 04/17/2018 12:00 AM    BUN/Creatinine ratio 12 04/17/2018 12:00 AM    GFR est  04/17/2018 12:00 AM    GFR est non-AA 89 04/17/2018 12:00 AM    Calcium 9.2 04/17/2018 12:00 AM    Bilirubin, total 0.7 07/27/2009 04:45 AM    Alk.  phosphatase 81 07/27/2009 04:45 AM    Protein, total 7.4 07/27/2009 04:45 AM    Albumin 3.8 07/27/2009 04:45 AM    Globulin 3.6 07/27/2009 04:45 AM    A-G Ratio 1.1 07/27/2009 04:45 AM    ALT (SGPT) 33 07/27/2009 04:45 AM            Total Time: 15 minutes      Claritza Meade MD    [address] [facility],[address] [facility], [address] [address],[address]  Danis Webster  [phone] / [phone] Fax

## 2020-12-14 ENCOUNTER — TELEPHONE (OUTPATIENT)
Dept: CARDIOLOGY CLINIC | Age: 70
End: 2020-12-14

## 2020-12-14 RX ORDER — DILTIAZEM HYDROCHLORIDE 240 MG/1
CAPSULE, COATED, EXTENDED RELEASE ORAL
Qty: 90 CAP | Refills: 1 | Status: SHIPPED | OUTPATIENT
Start: 2020-12-14 | End: 2021-04-12

## 2020-12-14 NOTE — TELEPHONE ENCOUNTER
Patient is calling to see if he would be able to get the COVID vaccine when it is available. Please advise.     Phone #: [phone]  Thanks

## 2020-12-14 NOTE — TELEPHONE ENCOUNTER
Requested Prescriptions     Signed Prescriptions Disp Refills    dilTIAZem ER (CARDIZEM CD) 240 mg capsule 90 Cap 1     Sig: TAKE 1 CAPSULE BY MOUTH EVERY NIGHT     Authorizing Provider: Dwight Sylvester     Ordering User: Alivia De Luna       Last office visit 11/6/2020  /  Per Dr. Lyla Ochoa   Date Time Provider Namita Ceballos   1/6/2021 11:00 AM MD JUNIOR Camp BS AMB   1/18/2021  2:00 PM PEDRO ROBISON BS AMB   1/18/2021  4:00 PM MD SANDRO Anne BS AMB

## 2020-12-15 NOTE — TELEPHONE ENCOUNTER
Patient returned call. Two patient indentifiers verified. Pt was informed that from a cardiac stand point he is fine to get the vaccine but he should check with his other providers. Pt asked if we would be given the vaccine and he was informed that we will not and to check with his primary care. Pt verbalized understanding and denies any further questions.      Future Appointments   Date Time Provider Namita Ceballos   1/6/2021 11:00 AM MD JUNIOR Lion BS AMB   1/18/2021  2:00 PM PEDRO ROBISON BS AMB   1/18/2021  4:00 PM MD SANDRO Morales BS AMB

## 2020-12-16 RX ORDER — CLOTRIMAZOLE AND BETAMETHASONE DIPROPIONATE 10; .64 MG/G; MG/G
CREAM TOPICAL
Qty: 15 G | Refills: 11 | Status: SHIPPED | OUTPATIENT
Start: 2020-12-16 | End: 2021-09-20 | Stop reason: SDUPTHER

## 2021-01-04 PROBLEM — B36.9 FUNGAL INFECTION OF SKIN: Status: ACTIVE | Noted: 2021-01-04

## 2021-01-18 ENCOUNTER — TELEPHONE (OUTPATIENT)
Dept: CARDIOLOGY CLINIC | Age: 71
End: 2021-01-18

## 2021-01-18 NOTE — TELEPHONE ENCOUNTER
Patient is requesting to speak with Kateryna Marion regarding his upcoming appointments. Patient is also wondering if he is ordering an echocardiogram because he inquired about the covid vaccine or if this is routine. Please advise.     Phone: [phone]

## 2021-01-25 NOTE — TELEPHONE ENCOUNTER
Patient returned call. Two patient indentifiers verified. Pt wanted his appointment moved out. Pt stated that he is not comfortable coming in to the office yet. Pt verbalized understanding and rescheduled.      Future Appointments   Date Time Provider Namita Lin   2/17/2021 11:15 AM MD JUNIOR Johnson BS AMB   3/8/2021 10:00 AM PEDRO ROBISON BS AMB   3/8/2021 10:40 AM MD SANDRO Reddy BS AMB

## 2021-04-12 RX ORDER — DILTIAZEM HYDROCHLORIDE 240 MG/1
CAPSULE, COATED, EXTENDED RELEASE ORAL
Qty: 90 CAP | Refills: 1 | Status: SHIPPED | OUTPATIENT
Start: 2021-04-12 | End: 2021-09-30

## 2021-04-12 NOTE — TELEPHONE ENCOUNTER
Requested Prescriptions     Signed Prescriptions Disp Refills    dilTIAZem ER (CARDIZEM CD) 240 mg capsule 90 Cap 1     Sig: TAKE 1 CAPSULE BY MOUTH EVERY NIGHT     Authorizing Provider: Tenisha Henao     Ordering User: Bradley Mackay       Last office visit 11/6/2020    Per Dr. Vallery Hodgkins   Date Time Provider Namita Ceballos   4/12/2021  1:00 PM PEDRO ROBISON AMB   4/12/2021  1:40 PM MD SANDRO Pinzon AMB

## 2021-07-09 ENCOUNTER — VIRTUAL VISIT (OUTPATIENT)
Dept: UROLOGY | Age: 71
End: 2021-07-09

## 2021-07-09 DIAGNOSIS — B36.9 FUNGAL INFECTION OF SKIN: ICD-10-CM

## 2021-07-09 DIAGNOSIS — N52.9 IMPOTENCE: ICD-10-CM

## 2021-07-09 DIAGNOSIS — C61 MALIGNANT NEOPLASM OF PROSTATE (HCC): Primary | ICD-10-CM

## 2021-07-09 DIAGNOSIS — R32 URINARY INCONTINENCE, UNSPECIFIED TYPE: ICD-10-CM

## 2021-07-09 NOTE — ASSESSMENT & PLAN NOTE
He is a diabetic and is prone to fungal skin rashes. We had discussed a regimen of nystatin or clotrimazole betamethasone topically.

## 2021-07-09 NOTE — ASSESSMENT & PLAN NOTE
He is status post prostatectomy 8/14/2014. His PSA was undetectable 10/2/2019. He should have annual PSA. He has an order today.

## 2021-07-23 ENCOUNTER — TELEPHONE (OUTPATIENT)
Dept: CARDIOLOGY CLINIC | Age: 71
End: 2021-07-23

## 2021-07-23 NOTE — TELEPHONE ENCOUNTER
Returned called to patient. Two patient indentifiers verified. Pt was informed of message. Pt verbalized understanding and denies any further questions at this time.      Future Appointments   Date Time Provider Namita Ceballos   8/11/2021  8:45 AM MD JUNIOR Lopze BS AMB   8/17/2021  9:40 AM MD SANDRO Cavanaugh AMB

## 2021-07-23 NOTE — TELEPHONE ENCOUNTER
Patient called because he is taking a vitamin, Centrum 3 and wants to know if it's okay to be taking this with his current regiment of medications. Please give a call back.       Phone [phone]

## 2021-08-09 ENCOUNTER — TELEPHONE (OUTPATIENT)
Dept: UROLOGY | Age: 71
End: 2021-08-09

## 2021-08-09 NOTE — TELEPHONE ENCOUNTER
lvm reminding pt to have psa drawn prior to apt on wednesday asked pt to give the office a call back

## 2021-08-10 LAB — PSA SERPL-MCNC: <0.1 NG/ML (ref 0–4)

## 2021-08-11 NOTE — TELEPHONE ENCOUNTER
PT  Called back concerned about going into the office to see dr Aníbal Santana due to [place]. I asked if he would like a VV. He said he is not savvy with the phone so the last one he had did not go through. He explained he had prostate cancer I and I let him know that a physical exam is best for us all not matter what was going on.  He agreed and thanked me for my time

## 2021-08-11 NOTE — TELEPHONE ENCOUNTER
PT called at 0846am to ask for results to PSA and said he did not know he was to come in and see the doctor. I explained last year we were VV only due to covid but he needed to see a doctor once a year for a physical evaluation. I rescheduled him for the following WED  In [place].

## 2021-08-17 ENCOUNTER — OFFICE VISIT (OUTPATIENT)
Dept: CARDIOLOGY CLINIC | Age: 71
End: 2021-08-17
Payer: MEDICARE

## 2021-08-17 VITALS
HEIGHT: 73 IN | DIASTOLIC BLOOD PRESSURE: 100 MMHG | HEART RATE: 92 BPM | BODY MASS INDEX: 41.75 KG/M2 | WEIGHT: 315 LBS | OXYGEN SATURATION: 97 % | SYSTOLIC BLOOD PRESSURE: 160 MMHG | RESPIRATION RATE: 16 BRPM

## 2021-08-17 DIAGNOSIS — I34.0 NONRHEUMATIC MITRAL VALVE REGURGITATION: ICD-10-CM

## 2021-08-17 DIAGNOSIS — I27.20 PULMONARY HYPERTENSION (HCC): ICD-10-CM

## 2021-08-17 DIAGNOSIS — R60.0 BILATERAL EDEMA OF LOWER EXTREMITY: ICD-10-CM

## 2021-08-17 DIAGNOSIS — R06.02 SHORTNESS OF BREATH ON EXERTION: Primary | ICD-10-CM

## 2021-08-17 DIAGNOSIS — I10 BENIGN ESSENTIAL HTN: ICD-10-CM

## 2021-08-17 PROCEDURE — G8417 CALC BMI ABV UP PARAM F/U: HCPCS | Performed by: INTERNAL MEDICINE

## 2021-08-17 PROCEDURE — 3017F COLORECTAL CA SCREEN DOC REV: CPT | Performed by: INTERNAL MEDICINE

## 2021-08-17 PROCEDURE — G8536 NO DOC ELDER MAL SCRN: HCPCS | Performed by: INTERNAL MEDICINE

## 2021-08-17 PROCEDURE — G8753 SYS BP > OR = 140: HCPCS | Performed by: INTERNAL MEDICINE

## 2021-08-17 PROCEDURE — 99214 OFFICE O/P EST MOD 30 MIN: CPT | Performed by: INTERNAL MEDICINE

## 2021-08-17 PROCEDURE — 93010 ELECTROCARDIOGRAM REPORT: CPT | Performed by: INTERNAL MEDICINE

## 2021-08-17 PROCEDURE — 93005 ELECTROCARDIOGRAM TRACING: CPT | Performed by: INTERNAL MEDICINE

## 2021-08-17 PROCEDURE — G8427 DOCREV CUR MEDS BY ELIG CLIN: HCPCS | Performed by: INTERNAL MEDICINE

## 2021-08-17 PROCEDURE — G0463 HOSPITAL OUTPT CLINIC VISIT: HCPCS | Performed by: INTERNAL MEDICINE

## 2021-08-17 PROCEDURE — 1101F PT FALLS ASSESS-DOCD LE1/YR: CPT | Performed by: INTERNAL MEDICINE

## 2021-08-17 PROCEDURE — G8755 DIAS BP > OR = 90: HCPCS | Performed by: INTERNAL MEDICINE

## 2021-08-17 PROCEDURE — G8510 SCR DEP NEG, NO PLAN REQD: HCPCS | Performed by: INTERNAL MEDICINE

## 2021-08-17 RX ORDER — LOSARTAN POTASSIUM 100 MG/1
TABLET ORAL
COMMUNITY

## 2021-08-17 RX ORDER — LORATADINE 10 MG/1
TABLET ORAL
COMMUNITY

## 2021-08-17 RX ORDER — NYSTATIN 100000 U/G
CREAM TOPICAL
COMMUNITY

## 2021-08-17 RX ORDER — OMEPRAZOLE 40 MG/1
40 CAPSULE, DELAYED RELEASE ORAL DAILY
COMMUNITY
Start: 2021-07-21

## 2021-08-17 NOTE — PROGRESS NOTES
[place]: [place]  [phone]    History of Present Illness:   Mr. Jamel Vallecillo is a 76 yo M followed in the past by Dr. Juancarlos Levy with a history of what sounds to be combined diastolic and systolic CHF with an EF of 45-50% in 2013 (in 2009 of 35-40% felt to be due to hypertension), essential hypertension, chronic lower extremity edema. 3/2017 echo was normal with an EF 60%, stress test no ischemia. LLE DVT 6/2017 had lysis and started on coumadin. Since his last visit, he has been more easily short of breath the last few months. He denies any chest pain. He has been compliant with his medications. He denies any significant palpitations, lightheadedness or dizziness. He is compensated on exam with clear lungs. He does have chronic lower extremity edema that is unchanged. His echocardiogram last year demonstrated preserved LV function, EF of 55% with mild to moderate mitral regurgitation and mild pulmonary hypertension. Assessment and Plan:   1. Shortness of breath. Will repeat an echocardiogram for further evaluation. On exam, he is compensated. If this is unrevealing, will have him follow back in six months. 2. Mitral regurgitation. Had been in the mild to moderate range. Repeat echocardiogram as noted above. 3. Pulmonary hypertension. This is mild by his check last time and likely secondary to sleep apnea. 4. Essential hypertension. Blood pressure is overall controlled at home in the 036C systolic. He does have a history of white coat hypertension. 5. Obesity. 6. Chronic lymphedema. 7. Left lower extremity DVT, status post thrombolysis in the past.    8. PVCs. He  has a past medical history of Deep venous embolism and thrombosis ([address] (9/2/2020), DVT (deep venous thrombosis) ([address] (06/2017), Goiter, Hypertension, Impotence (9/2/2020), Incontinence (9/2/2020), Malignant neoplasm of prostate ([address] (9/2/2020), and Prostate cancer ([address]       All other systems negative except as above. PE  Vitals:    08/17/21 0922   BP: (!) 160/100   Pulse: 92   Resp: 16   SpO2: 97%   Weight: 320 lb (145.2 kg)   Height: 6' 1\" (1.854 m)    Body mass index is 42.22 kg/m². General appearance - alert, well appearing, and in no distress  Mental status - affect appropriate to mood  Eyes - sclera anicteric, moist mucous membranes  Neck - supple, no JVD  Chest - clear to auscultation, no wheezes, rales or rhonchi  Heart - normal rate, regular rhythm, normal S1, S2, I-II/VI systolic murmur LUSB  Abdomen - soft, obese, nondistended, no masses or organomegaly  Neurological -no focal deficit  Extremities - peripheral pulses normal, 2+ LE pedal edema      Recent Labs:  Lab Results   Component Value Date/Time    Cholesterol, total 199 07/11/2009 04:45 AM    HDL Cholesterol 53 07/11/2009 04:45 AM    LDL, calculated 131.6 (H) 07/11/2009 04:45 AM    Triglyceride 72 07/11/2009 04:45 AM    CHOL/HDL Ratio 3.8 07/11/2009 04:45 AM     Lab Results   Component Value Date/Time    Creatinine (POC) 1.2 07/06/2009 10:30 PM    Creatinine 0.90 04/17/2018 12:00 AM     Lab Results   Component Value Date/Time    BUN 11 04/17/2018 12:00 AM    BUN (POC) 12 07/06/2009 10:30 PM     Lab Results   Component Value Date/Time    Potassium 4.1 04/17/2018 12:00 AM     Lab Results   Component Value Date/Time    Hemoglobin A1c 6.2 (H) 07/11/2009 04:45 AM     Lab Results   Component Value Date/Time    Hemoglobin (POC) 15.3 07/06/2009 10:30 PM    HGB 11.0 (L) 07/13/2017 11:57 AM     Lab Results   Component Value Date/Time    PLATELET 563 34/48/1914 11:57 AM       Reviewed:  Past Medical History:   Diagnosis Date    Deep venous embolism and thrombosis ([address] 9/2/2020    Being [facility] at [facility] for LLE DVT. He is on Coumadin 5 mg.  DVT (deep venous thrombosis) ([address] 06/2017    left lower leg    Goiter     Hypertension     Impotence 9/2/2020    He is s/p prostatectomy on 8/14/14.  He has partial tumescence but has trouble maintianing it for more than a few seconds. He had erectile dysfunction pre-operatively. Viagra is helpful to some extent. He has not tried a KAVITHA or Trimix.  Incontinence 9/2/2020    He is s/p prostatectomy on 8/14/14. Mild leakage, not bothersome. He can make it to the bathroom in time. He occasionally uses a safety pad. He can leak a few drops with urge.  Malignant neoplasm of prostate ([address] 9/2/2020    He is s/p prostatectomy 8/14/14. He had [place]'s 6 disease with negative margins and negative nodes. His PSA has been undetectable.  Prostate cancer [facility])      Social History     Tobacco Use   Smoking Status Never Smoker   Smokeless Tobacco Never Used     Social History     Substance and Sexual Activity   Alcohol Use No    Comment: in the past     Allergies   Allergen Reactions    Lisinopril Other (comments)     Altered kidney function    Pcn [Penicillins] Rash    Penicillins Unknown (comments)       Current Outpatient Medications   Medication Sig    omeprazole (PRILOSEC) 40 mg capsule Take 40 mg by mouth daily.  losartan (COZAAR) 100 mg tablet losartan 100 mg tablet   Take 1 tablet every day by oral route.  dilTIAZem ER (CARDIZEM CD) 240 mg capsule TAKE 1 CAPSULE BY MOUTH EVERY NIGHT    furosemide (LASIX) 20 mg tablet TAKE 2 TABLETS BY MOUTH EVERY DAY OR AS ADVISED BY PHYSICIAN    carvediloL (COREG) 25 mg tablet TAKE 1 TABLET BY MOUTH TWICE A DAY    olmesartan (BENICAR) 20 mg tablet TAKE 1 TABLET BY MOUTH EVERY DAY    terbinafine HCl (LAMISIL) 250 mg tablet TAKE 1 TABLET BY MOUTH EVERY DAY AS DIRECTED    warfarin (COUMADIN) 5 mg tablet Take 1 Tab by mouth as directed.  fluticasone (FLONASE) 50 mcg/actuation nasal spray 2 Sprays by Both Nostrils route daily.     nystatin (MYCOSTATIN) topical cream nystatin 100,000 unit/gram topical cream   APPLY TO AFFECTED AREA TWICE A DAY    loratadine (CLARITIN) 10 mg tablet loratadine 10 mg tablet   TAKE 1 TABLET BY MOUTH EVERY DAY    clotrimazole-betamethasone (LOTRISONE) topical cream APPLY TO AFFECTED AREA TWICE A DAY FOR 2 WEEKS     No current facility-administered medications for this visit.        Chilango Bernal MD  [address] [facility] [place]  [facility] [facility],[address]  [address]

## 2021-08-18 ENCOUNTER — OFFICE VISIT (OUTPATIENT)
Dept: UROLOGY | Age: 71
End: 2021-08-18
Payer: MEDICARE

## 2021-08-18 VITALS — BODY MASS INDEX: 44.1 KG/M2 | HEIGHT: 71 IN | WEIGHT: 315 LBS

## 2021-08-18 DIAGNOSIS — C61 MALIGNANT NEOPLASM OF PROSTATE (HCC): Primary | ICD-10-CM

## 2021-08-18 DIAGNOSIS — N52.9 IMPOTENCE: ICD-10-CM

## 2021-08-18 DIAGNOSIS — R32 URINARY INCONTINENCE, UNSPECIFIED TYPE: ICD-10-CM

## 2021-08-18 DIAGNOSIS — E66.01 OBESITY, MORBID (HCC): ICD-10-CM

## 2021-08-18 PROCEDURE — G8756 NO BP MEASURE DOC: HCPCS | Performed by: UROLOGY

## 2021-08-18 PROCEDURE — G8417 CALC BMI ABV UP PARAM F/U: HCPCS | Performed by: UROLOGY

## 2021-08-18 PROCEDURE — G8536 NO DOC ELDER MAL SCRN: HCPCS | Performed by: UROLOGY

## 2021-08-18 PROCEDURE — 99213 OFFICE O/P EST LOW 20 MIN: CPT | Performed by: UROLOGY

## 2021-08-18 PROCEDURE — G8432 DEP SCR NOT DOC, RNG: HCPCS | Performed by: UROLOGY

## 2021-08-18 PROCEDURE — G8427 DOCREV CUR MEDS BY ELIG CLIN: HCPCS | Performed by: UROLOGY

## 2021-08-18 NOTE — ASSESSMENT & PLAN NOTE
He is 7 years status post prostatectomy. He is at low risk disease. His PSA remains undetectable. Wishes to continue follow-up with me.   Plan a PSA in a year

## 2021-08-18 NOTE — PATIENT INSTRUCTIONS
He is advised to lose weight. He should eat less in the evening to be able to go to sleep hungry. He should avoid carbs and starches in evening. He will try these things.

## 2021-08-18 NOTE — PROGRESS NOTES
HISTORY OF PRESENT ILLNESS  Jamin Livingston is a 72 y.o. male. Chief Complaint   Patient presents with    Follow-up    Prostate Cancer     He has right knee pain and trouble walking. He has chronic shortness of breath. He had his COVID vaccine. He voids frequently, 6-7x per day. He wears a pad and diaper. He has more limited mobility. He uses a urinal at night. He reported 320 lbs but thinks he weighs more. Chronic Conditions Addressed Today     1. Obesity, morbid ([address]     Overview      BMI 44.63 on 8/18/2021          Current Assessment & Plan      He has not made much change over the years. He is chronically ill. I advised him to go to sleep hungry. He will try to eliminate carbs in the afternoon. 2. Impotence     Overview      He is s/p prostatectomy on 8/14/14. He has partial tumescence but has trouble maintianing it for more than a few seconds. 10/2/2019: He had erectile dysfunction pre-operatively. Viagra is helpful to some extent. He has not tried a KAVITHA or Trimix. Current Assessment & Plan       Not an issue           3. Malignant neoplasm of prostate [facility]) - Primary     Overview      He is s/p prostatectomy 8/14/14. He had [place]'s 6 disease with negative margins and negative nodes. His PSA has been undetectable, last drawn 8/9/21. Current Assessment & Plan      He is 7 years status post prostatectomy. He is at low risk disease. His PSA remains undetectable. Wishes to continue follow-up with me. Plan a PSA in a year          Relevant Orders     PSA, DIAGNOSTIC (PROSTATE SPECIFIC AG)    4. Incontinence     Overview      He is s/p prostatectomy on 8/14/14. He wears depends and a pad. Current Assessment & Plan       Stable mild leakage. He voids frequently. He has been living with it.                Past Medical History:    PMHx (including negatives):  has a past medical history of Deep venous embolism and thrombosis ([address] (9/2/2020), DVT (deep venous thrombosis) ([facility] (06/2017), Goiter, Hypertension, Impotence (9/2/2020), Incontinence (9/2/2020), Malignant neoplasm of prostate ([facility] (9/2/2020), and Prostate cancer ([facility] PSurgHx:  has a past surgical history that includes hx knee arthroscopy; hx prostatectomy; and hx prostatectomy (08/14/2014). PSocHx:  reports that he has never smoked. He has never used smokeless tobacco. He reports previous alcohol use. He reports previous drug use. Drug: Marijuana. Review of Systems   Respiratory: Positive for shortness of breath ([place]). Musculoskeletal: Positive for joint pain (Right knee pain). Physical Exam  Constitutional:       General: He is not in acute distress. Appearance: He is obese. Allergies   Allergen Reactions    Lisinopril Other (comments)     Altered kidney function    Pcn [Penicillins] Rash    Penicillins Unknown (comments)      Prior to Admission medications    Medication Sig Start Date End Date Taking? Authorizing Provider   loratadine (CLARITIN) 10 mg tablet loratadine 10 mg tablet   TAKE 1 TABLET BY MOUTH EVERY DAY   Yes Provider, Historical   dilTIAZem ER (CARDIZEM CD) 240 mg capsule TAKE 1 CAPSULE BY MOUTH EVERY NIGHT 4/12/21  Yes Kojo Lynn MD   clotrimazole-betamethasone (LOTRISONE) topical cream APPLY TO AFFECTED AREA TWICE A DAY FOR 2 WEEKS 12/16/20  Yes Mee Vaca MD   furosemide (LASIX) 20 mg tablet TAKE 2 TABLETS BY MOUTH EVERY DAY OR AS ADVISED BY PHYSICIAN 11/5/20  Yes Kojo Lynn MD   carvediloL (COREG) 25 mg tablet TAKE 1 TABLET BY MOUTH TWICE A DAY 9/23/20  Yes Kojo Lynn MD   olmesartan (BENICAR) 20 mg tablet TAKE 1 TABLET BY MOUTH EVERY DAY 10/25/19  Yes Provider, Historical   fluticasone (FLONASE) 50 mcg/actuation nasal spray 2 Sprays by Both Nostrils route daily. 2/1/17  Yes Provider, Historical   omeprazole (PRILOSEC) 40 mg capsule Take 40 mg by mouth daily.  7/21/21   Provider, Historical   nystatin (MYCOSTATIN) topical cream nystatin 100,000 unit/gram topical cream   APPLY TO AFFECTED AREA TWICE A DAY    Provider, Historical   losartan (COZAAR) 100 mg tablet losartan 100 mg tablet   Take 1 tablet every day by oral route. Provider, Historical   terbinafine HCl (LAMISIL) 250 mg tablet TAKE 1 TABLET BY MOUTH EVERY DAY AS DIRECTED 8/23/17   Provider, Historical   warfarin (COUMADIN) 5 mg tablet Take 1 Tab by mouth as directed. 7/5/17   Provider, Historical        ASSESSMENT and PLAN  Diagnoses and all orders for this visit:    1. Malignant neoplasm of prostate [facility])  Assessment & Plan:  He is 7 years status post prostatectomy. He is at low risk disease. His PSA remains undetectable. Wishes to continue follow-up with me. Plan a PSA in a year    Orders:  -     PSA, DIAGNOSTIC (PROSTATE SPECIFIC AG); Future    2. Urinary incontinence, unspecified type  Assessment & Plan:   Stable mild leakage. He voids frequently. He has been living with it. 3. Obesity, morbid ([address]  Assessment & Plan:  He has not made much change over the years. He is chronically ill. I advised him to go to sleep hungry. He will try to eliminate carbs in the afternoon.         4. Impotence  Assessment & Plan:   Not an issue             Dany Marcus MD

## 2021-08-18 NOTE — PROGRESS NOTES
Chief Complaint   Patient presents with    Follow-up    Prostate Cancer     1. Have you been to the ER, urgent care clinic since your last visit? Hospitalized since your last visit? No    2. Have you seen or consulted any other health care providers outside of the [address] [place] [address] since your last visit? Include any pap smears or colon screening.  No  Visit Vitals  Ht 5' 11\" (1.803 m)   Wt 320 lb (145.2 kg)   BMI 44.63 kg/m²

## 2021-08-18 NOTE — ASSESSMENT & PLAN NOTE
He has not made much change over the years. He is chronically ill. I advised him to go to sleep hungry. He will try to eliminate carbs in the afternoon.

## 2021-08-31 ENCOUNTER — TELEPHONE (OUTPATIENT)
Dept: CARDIOLOGY CLINIC | Age: 71
End: 2021-08-31

## 2021-08-31 NOTE — TELEPHONE ENCOUNTER
Andrey Arias MD  [place]candy Das RN  Caller: Unspecified (Today,  9:37 AM)  Yes would need to be pcp.  thx

## 2021-08-31 NOTE — TELEPHONE ENCOUNTER
Returned call to patient. Two patient indentifiers verified. Pt was informed that he would need to get this from his primary care provider. Pt verbalized understanding and denies any further questions at this time.      Future Appointments   Date Time Provider Namita Lin   9/28/2021 11:00 AM PEDRO RICH BS AMB   2/15/2022 11:40 AM MD SANDRO Bailey BS AMB   8/17/2022 10:00 AM Kenyetta Watlon MD SUACHE BS AMB

## 2021-08-31 NOTE — TELEPHONE ENCOUNTER
Patient calling to ask how he can receive a handicap parking pass, please advise          [phone]

## 2021-09-08 RX ORDER — CARVEDILOL 25 MG/1
TABLET ORAL
Qty: 180 TABLET | Refills: 3 | Status: SHIPPED | OUTPATIENT
Start: 2021-09-08

## 2021-09-08 NOTE — TELEPHONE ENCOUNTER
Requested Prescriptions     Signed Prescriptions Disp Refills    carvediloL (COREG) 25 mg tablet 180 Tablet 3     Sig: TAKE 1 TABLET BY MOUTH TWICE A DAY     Authorizing Provider: Jen Madrid User: Raheel Goodman       Last office visit 8/17/2021    Per Dr. Daniel Nunez   Date Time Provider Namita Ceballos   9/28/2021 11:00 AM PEDRO RICH BS AMB   2/15/2022 11:40 AM MD SANDRO Underwood BS AMB   8/17/2022 10:00 AM Lyndsey Walton MD SUAC[facility] BS AMB

## 2021-09-16 ENCOUNTER — TELEPHONE (OUTPATIENT)
Dept: UROLOGY | Age: 71
End: 2021-09-16

## 2021-09-16 NOTE — TELEPHONE ENCOUNTER
PT wanted to know if there is anything he can take or lotion to put on for the leakage. PT states he leaks in the depends and gets a rash from it.

## 2021-09-16 NOTE — TELEPHONE ENCOUNTER
I can send the cream for skin irritation from the pads. He should change them more often if they are wet. If the leakage is bothersome he should come back to see Isabelle for cysto, CMG, Uroflow. He is pretty far out from prostatectomy so he may have something else going on. Jayden Lynn will determine best treatment option after.

## 2021-09-20 DIAGNOSIS — B36.9 FUNGAL INFECTION OF SKIN: Primary | ICD-10-CM

## 2021-09-20 RX ORDER — CLOTRIMAZOLE AND BETAMETHASONE DIPROPIONATE 10; .64 MG/G; MG/G
CREAM TOPICAL 2 TIMES DAILY
Qty: 30 G | Refills: 1 | Status: SHIPPED | OUTPATIENT
Start: 2021-09-20 | End: 2021-10-20

## 2021-09-20 NOTE — TELEPHONE ENCOUNTER
Resent the medication. It will be good for 30 days. If still problematic after that, he can let me know and we will alternate with Nystatin which was effective in the past also.

## 2021-09-21 NOTE — TELEPHONE ENCOUNTER
Pt called stating that [facility] claims [facility] never received script. I told pt I would call in a verbal script of Elfego's prescription. Spoke to pharmacist after being on hold for over 45min. Gave verbal script request.    Called pt and let him know.

## 2021-09-28 ENCOUNTER — TELEPHONE (OUTPATIENT)
Dept: CARDIOLOGY CLINIC | Age: 71
End: 2021-09-28

## 2021-09-28 NOTE — TELEPHONE ENCOUNTER
Returned call to patient. Explained to patient why he needs the echo. Pt stated he just had an echo. Echo was completed in 1/2020. Pt was informed that per Dr. Landry Wade office note patient is to have echo for MR and SOB. Pt rescheduled echo.      Future Appointments   Date Time Provider Namita Ceballos   10/4/2021  2:00 PM Narciso Garcia MD RES BS AMB   10/26/2021  8:00 AM PEDRO RICH BS AMB   2/15/2022 11:40 AM MD SANDRO Sanchez BS AMB   8/17/2022 10:00 AM Ashlie Walton MD SU[facility] BS AMB

## 2021-09-28 NOTE — TELEPHONE ENCOUNTER
Patient cancelled his appointment for 09/28/2021, requesting to discuss it, prior to rescheduling          [facility]:[phone]

## 2021-09-30 RX ORDER — DILTIAZEM HYDROCHLORIDE 240 MG/1
CAPSULE, COATED, EXTENDED RELEASE ORAL
Qty: 90 CAPSULE | Refills: 1 | Status: SHIPPED | OUTPATIENT
Start: 2021-09-30 | End: 2022-01-12

## 2021-10-19 ENCOUNTER — TELEPHONE (OUTPATIENT)
Dept: CARDIOLOGY CLINIC | Age: 71
End: 2021-10-19

## 2021-10-19 NOTE — TELEPHONE ENCOUNTER
Patient called stating that he needs to cancel the 10/26 appointment for an echo due to transportation. Patient would like to r/s to January but he wanted to speak with you or Dr. Jacek Jaeger to see if this was okay. Patient is still having a shortness of breath and difficulties while walking.     Phone: [phone]

## 2021-10-19 NOTE — TELEPHONE ENCOUNTER
Left a detailed message for patient that he should keep his echo appointment if he is having SOB. Pt to call back if he has any questions.    Future Appointments   Date Time Provider Namita Ceballos   10/26/2021  8:00 AM PEDRO RICH BS AMB   2/15/2022 11:40 AM MD SANDRO Troy BS AMB   8/17/2022 10:00 AM Shannen Walton MD SU[facility] BS AMB

## 2021-10-20 NOTE — TELEPHONE ENCOUNTER
Patient returned the nurse call and he doesn't want to do the Echocardiogram on 10/26 he would like to do it in January 22. Please give a call back.     Phone [phone]

## 2021-10-20 NOTE — TELEPHONE ENCOUNTER
Returned called to patient. Two patient indentifiers verified. Pt was informed that he should have echo completed that we wanted in Aug. Pt stated he wants to wait till first of the year. Pt appointment was rescheduled. Pt denies any further questions at this time.      Future Appointments   Date Time Provider Namita Ceballos   1/10/2022 10:00 AM PEDRO ROBISON BS AMB   2/15/2022 11:40 AM MD SANDRO Reddy BS AMB   8/17/2022 10:00 AM Park, Claud Buerger, MD SUACHE BS AMB

## 2022-01-05 RX ORDER — CLOTRIMAZOLE AND BETAMETHASONE DIPROPIONATE 10; .64 MG/G; MG/G
CREAM TOPICAL
Qty: 15 G | Refills: 11 | Status: SHIPPED | OUTPATIENT
Start: 2022-01-05

## 2022-01-12 RX ORDER — DILTIAZEM HYDROCHLORIDE 240 MG/1
CAPSULE, COATED, EXTENDED RELEASE ORAL
Qty: 90 CAPSULE | Refills: 1 | Status: SHIPPED | OUTPATIENT
Start: 2022-01-12

## 2022-01-12 NOTE — TELEPHONE ENCOUNTER
Requested Prescriptions     Signed Prescriptions Disp Refills    dilTIAZem ER (CARDIZEM CD) 240 mg capsule 90 Capsule 1     Sig: TAKE 1 CAPSULE BY MOUTH EVERY NIGHT     Authorizing Provider: Sharon Beth     Ordering User: Amy Sergio       Last office visit 8/17/2021    Per Dr. Brennon Mace   Date Time Provider Namita Lindi   2/15/2022 11:40 AM MD SANDRO Morris BS AMB   8/17/2022 10:00 AM Lei Walton MD SUAC[facility] BS AMB

## 2022-01-28 ENCOUNTER — TELEPHONE (OUTPATIENT)
Dept: CARDIOLOGY CLINIC | Age: 72
End: 2022-01-28

## 2022-01-28 NOTE — TELEPHONE ENCOUNTER
Patient would like a call back as the patient has questions about his furosemide 20mg, patient stated he had stop taking this medication because he was going to the bathroom so much but would like to know if it will be ok for him to start taking it again, patient also having shortness of breath, please advise      [phone]

## 2022-02-01 NOTE — TELEPHONE ENCOUNTER
Patient returned call. Two patient indentifiers verified. Pt was informed that he can take lasix as needed per his prescription. Pt verbalized understanding and denies any further questions at this time.      Future Appointments   Date Time Provider Namita Lin   2/24/2022  8:40 AM MD SANDRO Wilson AMB   8/17/2022 10:00 AM Arvin Walton MD SUAC[facility] BS AMB

## 2022-02-21 ENCOUNTER — TELEPHONE (OUTPATIENT)
Dept: CARDIOLOGY CLINIC | Age: 72
End: 2022-02-21

## 2022-02-21 NOTE — TELEPHONE ENCOUNTER
Patient called and wanted a call back to [phone] about his mobility and appt    He can be reached at [phone]    [facility]

## 2022-03-18 PROBLEM — I82.409 DEEP VENOUS EMBOLISM AND THROMBOSIS (HCC): Status: ACTIVE | Noted: 2020-09-02

## 2022-03-18 PROBLEM — R06.02 SHORTNESS OF BREATH: Status: ACTIVE | Noted: 2017-03-06

## 2022-03-18 PROBLEM — E04.2 MULTINODULAR GOITER (NONTOXIC): Status: ACTIVE | Noted: 2017-09-15

## 2022-03-18 PROBLEM — I42.9 CARDIOMYOPATHY (HCC): Status: ACTIVE | Noted: 2017-03-06

## 2022-03-18 PROBLEM — B36.9 FUNGAL INFECTION OF SKIN: Status: ACTIVE | Noted: 2021-01-04

## 2022-03-19 PROBLEM — E66.01 OBESITY, MORBID (HCC): Status: ACTIVE | Noted: 2018-02-27

## 2022-03-19 PROBLEM — R60.0 BILATERAL EDEMA OF LOWER EXTREMITY: Status: ACTIVE | Noted: 2017-03-06

## 2022-03-19 PROBLEM — N52.9 IMPOTENCE: Status: ACTIVE | Noted: 2020-09-02

## 2022-03-19 PROBLEM — R32 INCONTINENCE: Status: ACTIVE | Noted: 2020-09-02

## 2022-03-19 PROBLEM — I82.402 DEEP VEIN THROMBOSIS (DVT) OF LEFT LOWER EXTREMITY (HCC): Status: ACTIVE | Noted: 2017-08-17

## 2022-03-19 PROBLEM — C61 MALIGNANT NEOPLASM OF PROSTATE (HCC): Status: ACTIVE | Noted: 2020-09-02

## 2022-03-20 PROBLEM — I10 BENIGN ESSENTIAL HTN: Status: ACTIVE | Noted: 2017-03-06

## 2022-03-22 DIAGNOSIS — I89.0 CHRONIC ACQUIRED LYMPHEDEMA: ICD-10-CM

## 2022-03-22 DIAGNOSIS — I27.20 PULMONARY HYPERTENSION (HCC): ICD-10-CM

## 2022-03-22 DIAGNOSIS — R60.0 BILATERAL EDEMA OF LOWER EXTREMITY: ICD-10-CM

## 2022-03-22 DIAGNOSIS — R60.0 EDEMA OF LEFT LOWER EXTREMITY: ICD-10-CM

## 2022-03-24 RX ORDER — FUROSEMIDE 20 MG/1
TABLET ORAL
Qty: 180 TABLET | Refills: 1 | Status: SHIPPED | OUTPATIENT
Start: 2022-03-24

## 2022-03-24 NOTE — TELEPHONE ENCOUNTER
Requested Prescriptions     Signed Prescriptions Disp Refills    furosemide (LASIX) 20 mg tablet 180 Tablet 1     Sig: TAKE 2 TABLETS BY MOUTH EVERY DAY OR AS ADVISED BY PHYSICIAN     Authorizing Provider: Jonatan Coles     Ordering User: Eulalia Villanueva       Last office visit 8/17/2021    Per Dr. Roma Alberto   Date Time Provider Namita Ceballos   8/17/2022 10:00 AM MD JUNIOR Jones AMB

## 2022-05-02 NOTE — TELEPHONE ENCOUNTER
Returned call & advised pt. Waiting on response from Dr. Jennifer Tapia & he would receive a call back this afternoon or tomorrow.  He verbalized his understanding 27-Sep-2017 12:29 Consent 1/Introductory Paragraph: The rationale for Mohs was explained to the patient and consent was obtained. The risks, benefits and alternatives to therapy were discussed in detail. Specifically, the risks of infection, scarring, bleeding, prolonged wound healing, incomplete removal, allergy to anesthesia, nerve injury and recurrence were addressed. Prior to the procedure, the treatment site was clearly identified and confirmed by the patient using a mirror. All components of Universal Protocol/PAUSE Rule completed.

## 2022-08-09 ENCOUNTER — TELEPHONE (OUTPATIENT)
Dept: UROLOGY | Age: 72
End: 2022-08-09

## 2022-12-02 DIAGNOSIS — I27.20 PULMONARY HYPERTENSION (HCC): Primary | ICD-10-CM

## 2022-12-02 DIAGNOSIS — I42.9 CARDIOMYOPATHY, UNSPECIFIED TYPE (HCC): ICD-10-CM

## 2022-12-05 RX ORDER — CARVEDILOL 25 MG/1
TABLET ORAL
Qty: 60 TABLET | Refills: 0 | Status: SHIPPED | OUTPATIENT
Start: 2022-12-05

## 2023-01-13 NOTE — TELEPHONE ENCOUNTER
Requested Prescriptions     Signed Prescriptions Disp Refills    carvedilol (COREG) 25 mg tablet 60 Tab 11     Sig: Take 1 Tab by mouth two (2) times a day.      Authorizing Provider: Mayte Lema     Ordering User: Jorge Rosales     Per verbal
No

## 2023-02-03 DIAGNOSIS — I42.9 CARDIOMYOPATHY, UNSPECIFIED TYPE (HCC): ICD-10-CM

## 2023-02-03 DIAGNOSIS — I27.20 PULMONARY HYPERTENSION (HCC): ICD-10-CM

## 2023-02-06 RX ORDER — CARVEDILOL 25 MG/1
TABLET ORAL
Qty: 60 TABLET | Refills: 0 | OUTPATIENT
Start: 2023-02-06

## 2023-02-10 RX ORDER — DILTIAZEM HYDROCHLORIDE 240 MG/1
CAPSULE, COATED, EXTENDED RELEASE ORAL
Qty: 90 CAPSULE | Refills: 1 | OUTPATIENT
Start: 2023-02-10

## 2023-02-27 ENCOUNTER — TELEPHONE (OUTPATIENT)
Dept: UROLOGY | Age: 73
End: 2023-02-27

## 2023-02-27 DIAGNOSIS — C61 MALIGNANT NEOPLASM OF PROSTATE (HCC): Primary | ICD-10-CM

## 2023-02-27 NOTE — TELEPHONE ENCOUNTER
Patient called he missed his 1 yr appt. 8/22 due to health issues and not being able to get to appts. I have him scheduled for 3/29. He said he had some questions that he wants to talk to nurse about 1st about psa and 2nd he is having urinary issues and at his last appt. Dr Azam Ehceverria told him he could give him a medication.  Wants to talk as well about that

## 2023-02-27 NOTE — TELEPHONE ENCOUNTER
Pt stated he can not make his apt on the 29th either. . that hes really having knee problems and unable to get into the office at all so he just wants us to send in medication for him- I informed him that its been since 2021 that he really needs to be seen and evaluated for his concerns before anything is prescribed and he just kept saying hes unable to get into the office wants me to relay this message to dr. Aron Mabry so he can see what he says       Also asked me to put in a lab order for him.  Psa added

## 2023-02-27 NOTE — TELEPHONE ENCOUNTER
I suggest a VV then. We don't prescribe without a visit.   Dr. Elin Finn likes to see the patient and obtain history, symptoms, etc.

## 2023-03-03 NOTE — TELEPHONE ENCOUNTER
Pt states he cannot come in the office for an appt with Dr. Geena Chavira due to his health issues and his daughter having to bring him in a wheelchair. He asked could he do a VV instead but per [place] his wife states he cannot do a VV and needs to come in the office. Offered him an appt date and time but he did not want to come in . Please advise on what we should do

## 2023-03-12 DIAGNOSIS — I42.9 CARDIOMYOPATHY, UNSPECIFIED TYPE (HCC): ICD-10-CM

## 2023-03-12 DIAGNOSIS — I27.20 PULMONARY HYPERTENSION (HCC): ICD-10-CM

## 2023-03-13 RX ORDER — CARVEDILOL 25 MG/1
TABLET ORAL
Qty: 60 TABLET | Refills: 0 | OUTPATIENT
Start: 2023-03-13

## 2023-03-13 RX ORDER — CLOTRIMAZOLE AND BETAMETHASONE DIPROPIONATE 10; .64 MG/G; MG/G
CREAM TOPICAL
Qty: 30 G | Refills: 0 | Status: SHIPPED | OUTPATIENT
Start: 2023-03-13

## 2023-03-13 RX ORDER — DILTIAZEM HYDROCHLORIDE 240 MG/1
CAPSULE, COATED, EXTENDED RELEASE ORAL
Qty: 90 CAPSULE | Refills: 1 | OUTPATIENT
Start: 2023-03-13

## 2023-03-22 ENCOUNTER — TELEPHONE (OUTPATIENT)
Dept: UROLOGY | Age: 73
End: 2023-03-22

## 2023-03-22 NOTE — TELEPHONE ENCOUNTER
Pt wanted to know if he he could do his APPT vv due to him being in [facility]

## 2023-03-24 ENCOUNTER — VIRTUAL VISIT (OUTPATIENT)
Dept: UROLOGY | Age: 73
End: 2023-03-24

## 2023-05-25 RX ORDER — FUROSEMIDE 20 MG/1
40 TABLET ORAL DAILY
Qty: 180 TABLET | Refills: 0 | Status: SHIPPED | OUTPATIENT
Start: 2023-05-25

## 2023-05-25 NOTE — TELEPHONE ENCOUNTER
Requested Prescriptions     Signed Prescriptions Disp Refills    furosemide (LASIX) 20 MG tablet 180 tablet 0     Sig: Take 2 tablets by mouth daily Please keep follow up with Dr. Chino Morse to receive further refills.      Authorizing Provider: Darci Kendrick     Ordering User: Roxann Hatfield     Verbal order per Dr. Chino Morse.

## 2023-06-27 ENCOUNTER — TELEPHONE (OUTPATIENT)
Age: 73
End: 2023-06-27

## 2023-06-27 DIAGNOSIS — C61 MALIGNANT NEOPLASM OF PROSTATE (HCC): Primary | ICD-10-CM

## 2023-06-28 DIAGNOSIS — C61 MALIGNANT NEOPLASM OF PROSTATE (HCC): ICD-10-CM

## 2023-08-03 ENCOUNTER — TELEPHONE (OUTPATIENT)
Age: 73
End: 2023-08-03

## 2023-08-03 RX ORDER — DILTIAZEM HYDROCHLORIDE 240 MG/1
240 CAPSULE, EXTENDED RELEASE ORAL NIGHTLY
Qty: 45 CAPSULE | Refills: 0 | Status: SHIPPED | OUTPATIENT
Start: 2023-08-03

## 2023-08-03 NOTE — TELEPHONE ENCOUNTER
Pt is calling because he needs a refill on diltiazem 240 mg. Pharmacy is confirmed    Pt has an apt scheduled for 9/11/23    [facility]   [address] [facility]   [phone]

## 2023-08-03 NOTE — TELEPHONE ENCOUNTER
Requested Prescriptions     Signed Prescriptions Disp Refills    dilTIAZem (TIAZAC) 240 MG extended release capsule 45 capsule 0     Sig: Take 1 capsule by mouth nightly     Authorizing Provider: Joyce Mack     Ordering User: Ilana Macias     Pt must keep future appointment for future refills.      VO per MD    Future Appointments   Date Time Provider [address]   9/11/2023  2:40 PM MD CHRISTOS Ramsey AMB

## 2023-08-17 RX ORDER — FUROSEMIDE 20 MG/1
TABLET ORAL
Qty: 180 TABLET | Refills: 3 | OUTPATIENT
Start: 2023-08-17

## 2023-08-17 NOTE — TELEPHONE ENCOUNTER
Requested Prescriptions     Refused Prescriptions Disp Refills    furosemide (LASIX) 20 MG tablet [Pharmacy Med Name: Furosemide 20 MG Oral Tablet] 180 tablet 3     Sig: TAKE 2 TABLETS BY MOUTH DAILY     Refused By: Ana Pérez     Reason for Refusal: Patient needs an appointment     VO per Dr. Morales Darby.

## 2023-09-07 ENCOUNTER — TELEPHONE (OUTPATIENT)
Age: 73
End: 2023-09-07

## 2023-09-07 NOTE — TELEPHONE ENCOUNTER
Pt. Asking to have a virtual appt. With Dr. Desi Carranza. Reinier Cortes.  Made for Nov.    Phone - [phone]

## 2023-09-15 RX ORDER — DILTIAZEM HYDROCHLORIDE 240 MG/1
CAPSULE, EXTENDED RELEASE ORAL
Qty: 45 CAPSULE | Refills: 0 | Status: SHIPPED | OUTPATIENT
Start: 2023-09-15

## 2023-09-15 NOTE — TELEPHONE ENCOUNTER
Requested Prescriptions     Signed Prescriptions Disp Refills    TIADYLT  MG extended release capsule 45 capsule 0     Sig: TAKE 1 CAPSULE BY MOUTH EVERY DAY AT NIGHT     Authorizing Provider: Elmo Grey     Ordering User:  Perla olivier MD    Future Appointments   Date Time Provider [address]   11/7/2023  1:00 PM MD CHRISTOS Kelly AMB

## 2023-09-27 NOTE — PROGRESS NOTES
Chief Complaint   Patient presents with    Follow-up    Prostate Cancer     1. Have you been to the ER, urgent care clinic since your last visit? Hospitalized since your last visit? No    2. Have you seen or consulted any other health care providers outside of the [address] [place] [address] since your last visit? Include any pap smears or colon screening.  No
HISTORY OF PRESENT ILLNESS  Latha Cortez is a 72 y.o. male. The patient was scheduled and did not connect via VV. Planned office visit did not occur. He is s/p prostatectomy 8/14/14. He had [place]'s 6 disease with negative margins and negative nodes. His PSA has been undetectable (10/2/2019). PSA and r/s office visit.
Yes

## 2023-11-07 ENCOUNTER — TELEPHONE (OUTPATIENT)
Age: 73
End: 2023-11-07

## 2023-11-07 RX ORDER — FUROSEMIDE 40 MG/1
40 TABLET ORAL DAILY
COMMUNITY

## 2023-11-07 RX ORDER — DILTIAZEM HYDROCHLORIDE 240 MG/1
CAPSULE, EXTENDED RELEASE ORAL
Qty: 90 CAPSULE | Refills: 3 | Status: SHIPPED | OUTPATIENT
Start: 2023-11-07

## 2023-11-07 NOTE — TELEPHONE ENCOUNTER
----- Message from Leila Marques MD sent at 11/7/2023  1:15 PM EST -----  Continue current meds (refills if needed). Follow up in 1 yr in person, can always change to VV if needed. Use his cell phone number.  thx

## 2023-11-07 NOTE — TELEPHONE ENCOUNTER
Telephone call made to patient. Two patient identifiers verified. Requested Prescriptions     Signed Prescriptions Disp Refills    dilTIAZem (TIADYLT ER) 240 MG extended release capsule 90 capsule 3     Sig: TAKE 1 CAPSULE BY MOUTH EVERY DAY AT NIGHT     Authorizing Provider: Lee Ying     Ordering User:  Yunier Interiano per Dr. Dilcia Razo.     Future Appointments   Date Time Provider [address]   11/7/2024 10:40 AM MD CHRISTOS Rizvi AMB

## 2024-01-30 ENCOUNTER — TELEPHONE (OUTPATIENT)
Age: 74
End: 2024-01-30

## 2024-01-30 NOTE — TELEPHONE ENCOUNTER
Patient left message regarding prostate surgery, and current labs.    Asked if his HH nurse, can draw PSA?  I explained that his HH nurse can complete PSA labwork    If he has any questions he can certainly call back.

## 2024-10-31 ENCOUNTER — TELEPHONE (OUTPATIENT)
Age: 74
End: 2024-10-31

## 2024-10-31 NOTE — TELEPHONE ENCOUNTER
Patient states he would like to speak with nurse about his appointment coming up.      Phone [phone]

## 2024-11-01 NOTE — TELEPHONE ENCOUNTER
Telephone call made to patient. Two patient identifiers verified.   The patient wanted to make sure we would still follow him as a patient since he had to push back his follow up until January. I let him know that we were still here and to just make sure he follow up.Verified understanding.

## 2024-12-27 ENCOUNTER — TELEPHONE (OUTPATIENT)
Age: 74
End: 2024-12-27

## 2024-12-27 NOTE — TELEPHONE ENCOUNTER
Telephone call made to patient. Two patient identifiers verified.   Let the patient know that because Dr. Fajardo hasn't seen him in over a year and his last appt was virtual, he will need to be seen in person. I also let him know that if he does not follow up in March, I won't be able to refill prescriptions after that. Verified understanding.     Future Appointments   Date Time Provider Department Center   3/19/2025  3:20 PM Willie Fajardo MD [facility] BS AMB

## 2024-12-27 NOTE — TELEPHONE ENCOUNTER
Patient concerned about being on not scheduling list, he is recovering from knee problem and has rehab but it is difficult to be mobile at this moment, possibly he could be considered for virtual if available, just being proactive since patient has a mother in law whom also in hospice and him or spouse have to take care of her as well, please follow with patient to confirm his options and to clarify what policy is with cancellations and rescheduling when you get a opportunity at [phone], patient wanted to add that he has been with provider for a while and loves the provider as well

## 2025-04-17 ENCOUNTER — OFFICE VISIT (OUTPATIENT)
Age: 75
End: 2025-04-17
Payer: MEDICARE

## 2025-04-17 VITALS
OXYGEN SATURATION: 96 % | HEART RATE: 78 BPM | WEIGHT: 270 LBS | HEIGHT: 71 IN | DIASTOLIC BLOOD PRESSURE: 80 MMHG | SYSTOLIC BLOOD PRESSURE: 136 MMHG | BODY MASS INDEX: 37.8 KG/M2

## 2025-04-17 DIAGNOSIS — R06.02 SHORTNESS OF BREATH: ICD-10-CM

## 2025-04-17 DIAGNOSIS — I10 BENIGN ESSENTIAL HTN: ICD-10-CM

## 2025-04-17 DIAGNOSIS — R60.0 BILATERAL EDEMA OF LOWER EXTREMITY: Primary | ICD-10-CM

## 2025-04-17 DIAGNOSIS — I82.409 DEEP VENOUS EMBOLISM AND THROMBOSIS (HCC): ICD-10-CM

## 2025-04-17 DIAGNOSIS — I34.0 NONRHEUMATIC MITRAL VALVE REGURGITATION: ICD-10-CM

## 2025-04-17 PROCEDURE — 93010 ELECTROCARDIOGRAM REPORT: CPT | Performed by: INTERNAL MEDICINE

## 2025-04-17 PROCEDURE — 3079F DIAST BP 80-89 MM HG: CPT | Performed by: INTERNAL MEDICINE

## 2025-04-17 PROCEDURE — 99214 OFFICE O/P EST MOD 30 MIN: CPT | Performed by: INTERNAL MEDICINE

## 2025-04-17 PROCEDURE — 1126F AMNT PAIN NOTED NONE PRSNT: CPT | Performed by: INTERNAL MEDICINE

## 2025-04-17 PROCEDURE — 3075F SYST BP GE 130 - 139MM HG: CPT | Performed by: INTERNAL MEDICINE

## 2025-04-17 PROCEDURE — 93005 ELECTROCARDIOGRAM TRACING: CPT | Performed by: INTERNAL MEDICINE

## 2025-04-17 PROCEDURE — 1123F ACP DISCUSS/DSCN MKR DOCD: CPT | Performed by: INTERNAL MEDICINE

## 2025-04-17 PROCEDURE — G2211 COMPLEX E/M VISIT ADD ON: HCPCS | Performed by: INTERNAL MEDICINE

## 2025-04-17 PROCEDURE — 1159F MED LIST DOCD IN RCRD: CPT | Performed by: INTERNAL MEDICINE

## 2025-04-17 RX ORDER — ALBUTEROL SULFATE 90 UG/1
INHALANT RESPIRATORY (INHALATION)
COMMUNITY

## 2025-04-17 ASSESSMENT — PATIENT HEALTH QUESTIONNAIRE - PHQ9
2. FEELING DOWN, DEPRESSED OR HOPELESS: NOT AT ALL
1. LITTLE INTEREST OR PLEASURE IN DOING THINGS: NOT AT ALL
SUM OF ALL RESPONSES TO PHQ QUESTIONS 1-9: 0

## 2025-04-17 NOTE — PROGRESS NOTES
[facility] [place]:   [phone]    Mr. Lim is a 75 yo M followed in the past by Dr. Vance with a history of what sounds to be combined diastolic and systolic CHF with an EF of 45-50% in 2013 (in 2009 of 35-40% felt to be due to hypertension), essential hypertension, chronic lower extremity edema. 3/2017 echo was normal with an EF 60%, stress test no ischemia. 2020 echo EF 55%. LLE DVT 6/2017 had lysis and started on coumadin.      Since his last visit, he has been doing okay cardiac wise.  No exertional chest pain.  His breathing has been stable.  He has been immobile due to issues with his knees.  On exam, his lungs are clear, but he does have chronic lymphedema unchanged.  His weight is actually down from last time, though it has been more than a year from 320 to 270 pounds.  His dietary intake and decreased sodium is much better.  He does have providers see him once a month through [facility] Mary Anne Garcia NP.      Assessment and Plan:    1. Pulmonary hypertension, mitral regurgitation.  Has been in the mild to moderate range.  He does have chronic lymphedema that is unchanged.  No active cardiac issues and we will have him do a virtual visit in six months.  Wll have him do an in-person echocardiogram in one year.    2. Obesity.  He has lost weight.    3. Lower extremity DVT, status post thrombolysis in the past.    4. PVCs.      He  has a past medical history of Deep venous embolism and thrombosis (HCC), DVT (deep venous thrombosis) (HCC), Goiter, Hypertension, Impotence, Incontinence, Malignant neoplasm of prostate (HCC), and Prostate cancer (HCC).    All other systems negative except as above.     PE  Vitals:    04/17/25 1102   BP: 136/80   BP Site: Left Upper Arm   Patient Position: Sitting   BP Cuff Size: Large Adult   Pulse: 78   SpO2: 96%   Weight: 122.5 kg (270 lb)   Height: 1.803 m (5' 11\")    Body mass index is 37.66 kg/m².  General appearance - alert, well appearing, and